# Patient Record
Sex: FEMALE | Race: WHITE | URBAN - METROPOLITAN AREA
[De-identification: names, ages, dates, MRNs, and addresses within clinical notes are randomized per-mention and may not be internally consistent; named-entity substitution may affect disease eponyms.]

---

## 2017-06-09 ENCOUNTER — OFFICE VISIT (OUTPATIENT)
Dept: PSYCHIATRY | Facility: CLINIC | Age: 72
End: 2017-06-09
Attending: CLINICAL NURSE SPECIALIST
Payer: COMMERCIAL

## 2017-06-09 VITALS — SYSTOLIC BLOOD PRESSURE: 124 MMHG | DIASTOLIC BLOOD PRESSURE: 82 MMHG | HEART RATE: 93 BPM | WEIGHT: 151 LBS

## 2017-06-09 DIAGNOSIS — F29 PSYCHOSIS, UNSPECIFIED PSYCHOSIS TYPE (H): ICD-10-CM

## 2017-06-09 DIAGNOSIS — F06.30 MOOD DISORDER DUE TO KNOWN PHYSIOLOGICAL CONDITION: Primary | ICD-10-CM

## 2017-06-09 PROCEDURE — 99212 OFFICE O/P EST SF 10 MIN: CPT | Mod: ZF

## 2017-06-09 RX ORDER — METHENAMINE HIPPURATE 1000 MG/1
1 TABLET ORAL
COMMUNITY
Start: 2014-10-03 | End: 2019-07-05

## 2017-06-09 RX ORDER — LORATADINE 10 MG/1
10 TABLET ORAL DAILY
Qty: 30 TABLET | Refills: 1 | COMMUNITY
Start: 2017-06-09

## 2017-06-09 RX ORDER — AMOXICILLIN 500 MG/1
4 CAPSULE ORAL
COMMUNITY
Start: 2017-05-18

## 2017-06-09 RX ORDER — PREDNISONE 5 MG/1
5 TABLET ORAL
COMMUNITY
Start: 2016-05-27

## 2017-06-09 RX ORDER — LISINOPRIL 10 MG/1
10 TABLET ORAL
COMMUNITY
Start: 2017-03-10

## 2017-06-09 RX ORDER — QUETIAPINE FUMARATE 200 MG/1
200 TABLET, FILM COATED ORAL AT BEDTIME
Qty: 90 TABLET | Refills: 1 | Status: SHIPPED | OUTPATIENT
Start: 2017-06-09 | End: 2018-01-19

## 2017-06-09 RX ORDER — AMLODIPINE BESYLATE 10 MG/1
10 TABLET ORAL
COMMUNITY
Start: 2016-09-09 | End: 2017-06-09

## 2017-06-09 NOTE — MR AVS SNAPSHOT
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      Your next 10 appointments already scheduled     Dec 29, 2017  1:15 PM CST   Adult Med Follow UP with KANCHAN Orosco CNS   Psychiatry Clinic ([facility])    [address] [facility] [address] [phone]              Who to contact     Please call your clinic at [phone] to:    Ask questions about your health    Make or cancel appointments    Discuss your medicines    Learn about your test results    Speak to your doctor   If you have compliments or concerns about an experience at your clinic, or if you wish to file a complaint, please contact [facility] Patient Relations at [phone] or email us at [email or web address]         Additional Information About Your Visit        MyChart Information     Banc[employer] is an electronic gateway that provides easy, online access to your medical records. With [employer], you can request a clinic appointment, read your test results, renew a prescription or communicate with your care team.     To sign up for [employer] visit the website at [email or web address]   You will be asked to enter the access code listed below, as well as some personal information. Please follow the directions to create your username and password.     Your access code is: [email or web address]  Expires: 2017  9:53 PM     Your access code will  in 90 days. If you  need help or a new code, please contact your [facility] or call [phone] for assistance.        Care EveryWhere ID     This is your Care EveryWhere ID. This could be used by other organizations to access your [place] medical records  [id number]        Your Vitals Were     Pulse                   93            Blood Pressure from Last 3 Encounters:   06/09/17 124/82   11/04/16 128/77   05/27/16 145/81    Weight from Last 3 Encounters:   06/09/17 68.5 kg (151 lb)   11/04/16 74.3 kg (163 lb 12.8 oz)   05/27/16 73.8 kg (162 lb 9.6 oz)              Today, you had the following     No orders found for display         Today's Medication Changes          These changes are accurate as of: 6/9/17 11:59 PM.  If you have any questions, ask your nurse or doctor.               These medicines have changed or have updated prescriptions.        Dose/Directions    lisinopril 10 MG tablet   Commonly known as:  PRINIVIL/ZESTRIL   This may have changed:  Another medication with the same name was removed. Continue taking this medication, and follow the directions you see here.   Changed by:  Alyssia Winn APRN CNS        Dose:  10 mg   Take 10 mg by mouth   Refills:  0       predniSONE 5 MG tablet   Commonly known as:  DELTASONE   This may have changed:  Another medication with the same name was removed. Continue taking this medication, and follow the directions you see here.   Changed by:  Alyssia Winn APRN CNS        Dose:  5 mg   Take 5 mg by mouth   Refills:  0            Where to get your medicines      These medications were sent to [facility] [address] [place] [address] [address] [facility]  [address] [facility] [address]     Phone:  [phone]     QUEtiapine 200 MG tablet                Primary Care Provider Office Phone # Fax #    Feliberto Benny [phone]       [address]  [place] [address]        Thank you!     Thank you for choosing PSYCHIATRY CLINIC  for your care. Our goal is always to provide you with excellent care. Hearing back from our patients is one way we can continue to improve our services. Please take a few minutes to complete the written survey that you may receive in the mail after your visit with us. Thank you!             Your Updated Medication List - Protect others around you: Learn how to safely use, store and throw away your medicines at www.disposemymeds.org.          This list is accurate as of: 6/9/17 11:59 PM.  Always use your most recent med list.                   Brand Name Dispense Instructions for use    amLODIPine 10 MG tablet    NORVASC     Take 10 mg by mouth daily       amoxicillin 500 MG capsule    AMOXIL     Take 4 capsules by mouth       aspirin  MG EC tablet      Take 325 mg by mouth daily       atorvastatin 40 MG tablet    LIPITOR     Take 40 mg by mouth daily       CLARITIN 10 MG tablet   Generic drug:  loratadine     30 tablet    Take 1 tablet (10 mg) by mouth daily       EQL OMEGA 3 FISH OIL 1200 MG Caps      Take 1 capsule by mouth daily       folic acid 1 MG tablet    FOLVITE    90 tablet    Take 1 tablet (1 mg) by mouth daily       glipiZIDE 2.5 MG 24 hr tablet    GLUCOTROL XL     Take 2.5 mg by mouth daily       lisinopril 10 MG tablet    PRINIVIL/ZESTRIL     Take 10 mg by mouth       methenamine hippurate 1 G Tabs tablet    HIPREX     Take 1 g by mouth       predniSONE 5 MG tablet    DELTASONE     Take 5 mg by mouth       * priLOSEC 40 MG capsule   Generic drug:  omeprazole      Take 40 mg by mouth daily       * omeprazole 20 MG CR capsule    priLOSEC     Take 20 mg by mouth       psyllium 28.3 % Powd      Take 1 tsp. by mouth daily       QUEtiapine 200 MG tablet    SEROquel    90 tablet    Take 1 tablet (200 mg) by mouth At Bedtime       vitamin D3 1000 UNITS Caps      Take 1 capsule by mouth       * Notice:  This list  has 2 medication(s) that are the same as other medications prescribed for you. Read the directions carefully, and ask your doctor or other care provider to review them with you.

## 2017-06-09 NOTE — PROGRESS NOTES
Outpatient Psychiatry Progress Note     Provider: KANCHAN Orosco CNS  Date: 2017  Service:  Medication follow up with counseling.   Patient Identification: Radha Fitch  : 1945   MRN: [id number]    Radha Fitch is a 71 year old year old female who presents for ongoing psychiatric care.  Radha Fitch was last seen in clinic on 17.   At that time,   Assessment & Plan       Radha Fitch is seen today for follow up and reports her mood has been stable with continued auditory hallucinations. 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   Another granddaughter is getting  . She is looking forward to this.  Had knee replacement in November but there is some problem and she needs to have ligament shortened.    Side effects of medication include: no change  Psychiatric Review of Systems:  The patient endorses symptoms of depression: In the last 2 weeks per PHQ 9 no symptoms  She  patient endorses symptoms of anxiety : denies  She endorses symptoms of kavon including none.    She endorses symptoms of psychosis including hallucinations stable.       Review of Medical Systems:  Sleep: stable  Energy: stable  Concentration: stable  Appetite: has been working on losing weight  GI Concerns:  none  Cardiac concerns: none  Neurological concerns: no new concerns  Other medical concerns: no new concerns  Current Substance Use:  Alcohol:none  Other drugs:none  Caffeine:not reviewed  Nicotine: none  Past Medical History: No past medical history on file.  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       Allergies:   Allergies   Allergen Reactions     Cinnamon      Other reaction(s): Other - Describe In Comment Field  Get sores in mouth- not sure if red dye in the cinnamon candy     Solifenacin      Other reaction(s): Other - Describe In Comment Field  Dry mouth     Sulfa Drugs      Other reaction(s): Edema  face          Current Medications     Current Outpatient Prescriptions Ordered in [facility]   Medication Sig Dispense Refill     QUEtiapine (SEROQUEL) 200 MG tablet Take 1 tablet (200 mg) by mouth At Bedtime 90 tablet 1     folic acid (FOLVITE) 1 MG tablet Take 1 tablet (1 mg) by mouth daily 90 tablet 0     amLODIPine (NORVASC) 10 MG tablet Take 10 mg by mouth daily       aspirin  MG tablet Take 325 mg by mouth daily       atorvastatin (LIPITOR) 40 MG tablet Take 40 mg by mouth daily       Cholecalciferol (D 1000) 1000 UNITS CAPS Take 1 capsule by mouth daily       Omega-3 Fatty Acids (EQL OMEGA 3 FISH OIL) 1200 MG CAPS Take 1 capsule by mouth daily       glipiZIDE (GLUCOTROL XL) 2.5 MG 24 hr tablet Take 2.5 mg by mouth daily       lisinopril (PRINIVIL,ZESTRIL) 20 MG tablet Take 20 mg by mouth daily       Methenamine Mandelate 0.5 G TABS Take 1 g by mouth daily       omeprazole (PRILOSEC) 40 MG capsule Take 40 mg by mouth daily       predniSONE (DELTASONE) 5 MG tablet Take 5 mg by mouth daily       psyllium 28.3 % POWD Take 1 tsp. by  "mouth daily       No current Epic-ordered facility-administered medications on file.         Mental Status Exam     Appearance:  Casually dressed and Well groomed  Behavior/relationship to examiner/demeanor:  Cooperative  Orientation: Oriented to person, place, time and situation  Psychomotor: normal form  Speech Rate:  Normal  Speech Spontaneity:  Normal  Mood:  \"okay\"  Affect:  Appropriate/mood-congruent  Thought Process (Associations):  Goal directed  Thought Content:  no overt psychosis, denies suicidal ideation, intent or thoughts and patient does not appear to be responding to internal stimuli  Abnormal Perception:  None  Attention/Concentration:  Normal  Language:  Intact  Insight:  Good  Judgment:  Good      Results     Vital signs: /82  Pulse 93  Wt 68.5 kg (151 lb)    Laboratory Data:  reviewed previous data    Assessment & Plan      Radha Fitch is seen today for follow up and reports symptoms are stable and she would like to continue current medication.    Diagnosis  Axis 1: Psychosis unspecified, Mood disorder due to known physiological conditions  Axis 2: none  Axis 3: See problem list in the medical record    Plan:  Medication: Continue current medication  OTC Recommendations: none  Lab Orders:  none  Referrals: none  Release of Information: none  Future Treatment Considerations:per symptoms  Return for Follow Up:6 months   The risks, benefits, alternatives and side effects have been discussed and are understood by the patient. The patient understands the risks of using street drugs or alcohol. There are no medical contraindications, the patient agrees to treatment, and has the capacity to do so. The patient understands to call 911 or come to the nearest ED if life threatening or urgent symptoms present.  Over 50% of this time was spent counseling the patient and/or coordinating care regarding review of social and occupational functioning.  In addition patient was counseled on health and " wellness practices to augment medication treatment of symptoms. See note for details.    Alyssia Winn, APRN CNS 6/9/2017

## 2017-06-09 NOTE — NURSING NOTE
Chief Complaint   Patient presents with     RECHECK     Psychosis, unspecified psychosis type      Reviewed allergies, smoking status, and pharmacy preference  Administered abuse screening questions-done 6/9/17   Obtained weight, blood pressure and heart rate   Jess Wilson LPN

## 2017-12-11 ENCOUNTER — OFFICE VISIT (OUTPATIENT)
Dept: URGENT CARE | Facility: URGENT CARE | Age: 72
End: 2017-12-11
Payer: COMMERCIAL

## 2017-12-11 ENCOUNTER — TRANSFERRED RECORDS (OUTPATIENT)
Dept: HEALTH INFORMATION MANAGEMENT | Facility: CLINIC | Age: 72
End: 2017-12-11

## 2017-12-11 VITALS
DIASTOLIC BLOOD PRESSURE: 84 MMHG | HEART RATE: 102 BPM | WEIGHT: 164 LBS | TEMPERATURE: 98.9 F | OXYGEN SATURATION: 92 % | SYSTOLIC BLOOD PRESSURE: 161 MMHG

## 2017-12-11 DIAGNOSIS — M79.89 SWOLLEN LEG: Primary | ICD-10-CM

## 2017-12-11 DIAGNOSIS — M71.22 SYNOVIAL CYST OF LEFT POPLITEAL SPACE: ICD-10-CM

## 2017-12-11 PROCEDURE — 99215 OFFICE O/P EST HI 40 MIN: CPT | Performed by: NURSE PRACTITIONER

## 2017-12-11 NOTE — MR AVS SNAPSHOT
"              After Visit Summary   12/11/2017    Radha Fitch    MRN: [id number]           Patient Information     Date Of Birth          1945        Visit Information        Provider Department      12/11/2017 7:10 PM Rashmi Nava APRN CNP [facility]        Today's Diagnoses     Swollen leg    -  1       Follow-ups after your visit        Your next 10 appointments already scheduled     Jan 19, 2018  1:45 PM CST   Adult Med Follow UP with KANCHAN Orosco CNS   Psychiatry Clinic ([facility])    [address]  [address] [facility] [id number] [phone]              Future tests that were ordered for you today     Open Future Orders        Priority Expected Expires Ordered    US Lower Extremity Venous Duplex Bilateral STAT  12/11/2018 12/11/2017            Who to contact     If you have questions or need follow up information about today's clinic visit or your schedule please contact [facility] directly at [phone].  Normal or non-critical lab and imaging results will be communicated to you by [employer], letter or phone within 4 business days after the clinic has received the results. If you do not hear from us within 7 days, please contact the clinic through [employer] or phone. If you have a critical or abnormal lab result, we will notify you by phone as soon as possible.  Submit refill requests through [employer] or call your pharmacy and they will forward the refill request to us. Please allow 3 business days for your refill to be completed.          Additional Information About Your Visit        Interactive FitnessharNetzoptiker Information     Ps[employer] lets you send messages to your doctor, view your test results, renew your prescriptions, schedule appointments and more. To sign up, go to [email or web address] . Click on \"Log in\" on the left side of the screen, which will take you to the Welcome page. Then click on \"Sign up Now\" on the " right side of the page.     You will be asked to enter the access code listed below, as well as some personal information. Please follow the directions to create your username and password.     Your access code is: [email or web address]  Expires: 3/11/2018  7:51 PM     Your access code will  in 90 days. If you need help or a new code, please call your [place] clinic or [phone].        Care EveryWhere ID     This is your Care EveryWhere ID. This could be used by other organizations to access your [place] medical records  [id number]        Your Vitals Were     Pulse Temperature Pulse Oximetry             102 98.9  F (37.2  C) (Oral) 92%          Blood Pressure from Last 3 Encounters:   17 161/84    Weight from Last 3 Encounters:   17 164 lb (74.4 kg)               Primary Care Provider Office Phone # Fax #    Feliberto Benny [phone]       [facility] [address] [place] [address]        Equal Access to Services     [facility]: Hadii aad ku hadasho Soomaali, waaxda luqadaha, qaybta kaalmada adeegyada, waxay idiin hayaan mary ann agrawal . So [facility] [phone].    ATENCIÓN: Si habla español, tiene a espinosa disposición servicios gratuitos de asistencia lingüística. Llame al [phone].    We comply with applicable federal civil rights laws and [place] laws. We do not discriminate on the basis of race, color, national origin, age, disability, sex, sexual orientation, or gender identity.            Thank you!     Thank you for choosing [facility]  for your care. Our goal is always to provide you with excellent care. Hearing back from our patients is one way we can continue to improve our services. Please take a few minutes to complete the written survey that you may receive in the mail after your visit with us. Thank you!             Your Updated Medication List - Protect others around you: Learn how to safely use, store and throw away your  medicines at www.disposemymeds.org.          This list is accurate as of: 12/11/17  7:51 PM.  Always use your most recent med list.                   Brand Name Dispense Instructions for use Diagnosis    amLODIPine 10 MG tablet    NORVASC     Take 10 mg by mouth daily        amoxicillin 500 MG capsule    AMOXIL     Take 4 capsules by mouth        aspirin  MG EC tablet      Take 325 mg by mouth daily        atorvastatin 40 MG tablet    LIPITOR     Take 40 mg by mouth daily        CLARITIN 10 MG tablet   Generic drug:  loratadine     30 tablet    Take 1 tablet (10 mg) by mouth daily        EQL OMEGA 3 FISH OIL 1200 MG Caps      Take 1 capsule by mouth daily        folic acid 1 MG tablet    FOLVITE    90 tablet    Take 1 tablet (1 mg) by mouth daily    Hyperhomocystinemia (H)       glipiZIDE 2.5 MG 24 hr tablet    GLUCOTROL XL     Take 2.5 mg by mouth daily        lisinopril 10 MG tablet    PRINIVIL/ZESTRIL     Take 10 mg by mouth        methenamine hippurate 1 G Tabs tablet    HIPREX     Take 1 g by mouth        predniSONE 5 MG tablet    DELTASONE     Take 5 mg by mouth        * priLOSEC 40 MG capsule   Generic drug:  omeprazole      Take 40 mg by mouth daily        * omeprazole 20 MG CR capsule    priLOSEC     Take 20 mg by mouth        psyllium 28.3 % Powd      Take 1 tsp. by mouth daily        QUEtiapine 200 MG tablet    SEROquel    90 tablet    Take 1 tablet (200 mg) by mouth At Bedtime    Psychosis, unspecified psychosis type       vitamin D3 1000 UNITS Caps      Take 1 capsule by mouth        * Notice:  This list has 2 medication(s) that are the same as other medications prescribed for you. Read the directions carefully, and ask your doctor or other care provider to review them with you.

## 2017-12-12 NOTE — PROGRESS NOTES
SUBJECTIVE:                                                    Radha Fitch is a 72 year old female who presents to clinic today for the following health issues:    Musculoskeletal problem/pain      Duration: 2 days    Description  Location: L lower leg    Intensity:  moderate    Accompanying signs and symptoms: pain, swelling, redness.     History  Previous similar problem: no   Previous evaluation:  none    Precipitating or alleviating factors:  Trauma or overuse: YES, bumped leg a few weeks ago  Aggravating factors include: sitting, standing and walking    Therapies tried and outcome: nothing      Problem list and histories reviewed & adjusted, as indicated.  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    Social History   Substance Use Topics     Smoking status: Never Smoker     Smokeless tobacco: Never Used     Alcohol use Not on file     No family history on file.        ROS:  Constitutional, HEENT, cardiovascular, pulmonary, GI, , musculoskeletal, neuro, skin, endocrine and psych systems are negative, except as otherwise noted.      OBJECTIVE:   /84  Pulse 102  Temp 98.9  F (37.2  C) (Oral)  Wt 164 lb (74.4 kg)  SpO2 92%  There is no height or weight on file to calculate BMI.  GENERAL: Alert and no distress  RESP: lungs clear to auscultation - no rales, rhonchi or wheezes  CV: regular rate and rhythm, normal S1 S2, no S3 or S4, no murmur, click or rub, no peripheral edema and peripheral pulses strong  MS: no gross musculoskeletal defects noted, no edema    Diagnostic Test Results:  none     ASSESSMENT/PLAN:     1. Swollen leg  Rule out dvt which could be  life threatening, sent to [facility]   Son in law will drive her now  Will do hold and call with results    Received results 905 pm, no dvt  Did find complex bakers cyst [id number], called patient with results  Ortho referral placed, call and schedule tomorrow when they open    Follow up with pcp as needed    KANCHAN Guaman CNP  [facility]

## 2018-01-19 ENCOUNTER — OFFICE VISIT (OUTPATIENT)
Dept: PSYCHIATRY | Facility: CLINIC | Age: 73
End: 2018-01-19
Attending: CLINICAL NURSE SPECIALIST
Payer: COMMERCIAL

## 2018-01-19 VITALS — HEART RATE: 101 BPM | DIASTOLIC BLOOD PRESSURE: 80 MMHG | SYSTOLIC BLOOD PRESSURE: 137 MMHG | WEIGHT: 163 LBS

## 2018-01-19 DIAGNOSIS — F29 PSYCHOSIS, UNSPECIFIED PSYCHOSIS TYPE (H): Primary | ICD-10-CM

## 2018-01-19 DIAGNOSIS — F06.30 MOOD DISORDER DUE TO KNOWN PHYSIOLOGICAL CONDITION: ICD-10-CM

## 2018-01-19 PROCEDURE — G0463 HOSPITAL OUTPT CLINIC VISIT: HCPCS | Mod: ZF

## 2018-01-19 RX ORDER — AMLODIPINE BESYLATE 5 MG/1
TABLET ORAL
COMMUNITY
Start: 2008-03-21

## 2018-01-19 RX ORDER — QUETIAPINE FUMARATE 200 MG/1
200 TABLET, FILM COATED ORAL AT BEDTIME
Qty: 90 TABLET | Refills: 1 | Status: SHIPPED | OUTPATIENT
Start: 2018-01-19 | End: 2018-07-31

## 2018-01-19 ASSESSMENT — PAIN SCALES - GENERAL: PAINLEVEL: NO PAIN (0)

## 2018-01-19 NOTE — MR AVS SNAPSHOT
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      Your next 10 appointments already scheduled     2018  1:15 PM CDT   Adult Med Follow UP with KANCHAN Orosco CNS   Psychiatry Clinic ([facility])    [address] [facility] [address] [phone]              Who to contact     Please call your clinic at [phone] to:    Ask questions about your health    Make or cancel appointments    Discuss your medicines    Learn about your test results    Speak to your doctor   If you have compliments or concerns about an experience at your clinic, or if you wish to file a complaint, please contact [facility] Patient Relations at [phone] or email us at [email or web address]         Additional Information About Your Visit        MyChart Information     myhomemovet is an electronic gateway that provides easy, online access to your medical records. With [employer], you can request a clinic appointment, read your test results, renew a prescription or communicate with your care team.     To sign up for myhomemovet visit the website at [email or web address]   You will be asked to enter the access code listed below, as well as some personal information. Please follow the directions to create your username and password.     Your access code is: [email or web address]  Expires: 3/11/2018  7:51 PM     Your access code will  in 90 days. If you  need help or a new code, please contact your [facility] or call [phone] for assistance.        Care EveryWhere ID     This is your Care EveryWhere ID. This could be used by other organizations to access your [facility] medical records  [id number]        Your Vitals Were     Pulse                   101            Blood Pressure from Last 3 Encounters:   01/19/18 137/80   12/11/17 161/84   06/09/17 124/82    Weight from Last 3 Encounters:   01/19/18 73.9 kg (163 lb)   12/11/17 74.4 kg (164 lb)   06/09/17 68.5 kg (151 lb)              Today, you had the following     No orders found for display         Today's Medication Changes          These changes are accurate as of 1/19/18 11:59 PM.  If you have any questions, ask your nurse or doctor.               These medicines have changed or have updated prescriptions.        Dose/Directions    NORVASC 5 MG tablet   This may have changed:  Another medication with the same name was removed. Continue taking this medication, and follow the directions you see here.   Generic drug:  amLODIPine   Changed by:  Alyssia Winn APRN CNS        Refills:  0            Where to get your medicines      These medications were sent to [facility] [address] [place] [address] [address] [place] [address] AT [facility] [place]  [address] [facility] [address]     Phone:  [phone]     QUEtiapine 200 MG tablet                Primary Care Provider Office Phone # Fax #    Feliberto Benny [phone]       [facility] [address] [place] [address]        Equal Access to Services     [facility] MISHA : Hadii aad ku hadasho Soomaali, waaxda luqadaha, qaybta kaalmada adecherryyadian, concha shetty. So [facility] [phone].    ATENCIÓN: Si habla español, tiene a espinosa disposición servicios gratuitos de asistencia lingüística. Llame al [phone].    We comply with applicable federal  civil rights laws and [place] laws. We do not discriminate on the basis of race, color, national origin, age, disability, sex, sexual orientation, or gender identity.            Thank you!     Thank you for choosing PSYCHIATRY CLINIC  for your care. Our goal is always to provide you with excellent care. Hearing back from our patients is one way we can continue to improve our services. Please take a few minutes to complete the written survey that you may receive in the mail after your visit with us. Thank you!             Your Updated Medication List - Protect others around you: Learn how to safely use, store and throw away your medicines at www.disposemymeds.org.          This list is accurate as of 1/19/18 11:59 PM.  Always use your most recent med list.                   Brand Name Dispense Instructions for use Diagnosis    amoxicillin 500 MG capsule    AMOXIL     Take 4 capsules by mouth        aspirin  MG EC tablet      Take 325 mg by mouth daily        atorvastatin 40 MG tablet    LIPITOR     Take 40 mg by mouth daily        CLARITIN 10 MG tablet   Generic drug:  loratadine     30 tablet    Take 1 tablet (10 mg) by mouth daily        EQL OMEGA 3 FISH OIL 1200 MG Caps      Take 1 capsule by mouth daily        folic acid 1 MG tablet    FOLVITE    90 tablet    Take 1 tablet (1 mg) by mouth daily    Hyperhomocystinemia (H)       glipiZIDE 2.5 MG 24 hr tablet    GLUCOTROL XL     Take 2.5 mg by mouth daily        lisinopril 10 MG tablet    PRINIVIL/ZESTRIL     Take 10 mg by mouth        methenamine hippurate 1 G Tabs tablet    HIPREX     Take 1 g by mouth        NORVASC 5 MG tablet   Generic drug:  amLODIPine           predniSONE 5 MG tablet    DELTASONE     Take 5 mg by mouth        * priLOSEC 40 MG capsule   Generic drug:  omeprazole      Take 40 mg by mouth daily        * omeprazole 20 MG CR capsule    priLOSEC     Take 20 mg by mouth        psyllium 28.3 % Powd      Take 1 tsp. by mouth daily         QUEtiapine 200 MG tablet    SEROquel    90 tablet    Take 1 tablet (200 mg) by mouth At Bedtime    Psychosis, unspecified psychosis type       vitamin D3 1000 UNITS Caps      Take 1 capsule by mouth        * Notice:  This list has 2 medication(s) that are the same as other medications prescribed for you. Read the directions carefully, and ask your doctor or other care provider to review them with you.

## 2018-01-19 NOTE — NURSING NOTE
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
## 2018-07-31 DIAGNOSIS — F29 PSYCHOSIS, UNSPECIFIED PSYCHOSIS TYPE (H): ICD-10-CM

## 2018-07-31 RX ORDER — QUETIAPINE FUMARATE 200 MG/1
200 TABLET, FILM COATED ORAL AT BEDTIME
Qty: 40 TABLET | Refills: 0 | Status: SHIPPED | OUTPATIENT
Start: 2018-07-31 | End: 2018-09-13

## 2018-07-31 NOTE — TELEPHONE ENCOUNTER
Medication requested: seroquel 200mg tabs  Last refilled: 5/7/18  Qty: 90      Last seen: 1/19/18  RTC: 6 months  Cancel: x1  No-show: 0  Next appt: 9/14/18    Refill decision:   Refill pended and routed to the provider for review/determination due to Cancelled x1

## 2018-08-20 ENCOUNTER — TELEPHONE (OUTPATIENT)
Dept: PSYCHIATRY | Facility: CLINIC | Age: 73
End: 2018-08-20

## 2018-08-20 NOTE — TELEPHONE ENCOUNTER
Med refill/question  Received: Today       Amanda Bustamante Radhika RN       Phone Number: [phone]                     Hi,     This pt called to reschedule an appointment with Alyssia from September 14 to October 26. She said she will need a refill of her medication before her next appointment (not necessarily right now). Could you see if Alyssia can allow another refill before her October appointment?     Thanks!       - Call to patient at [phone]   - No answer   - LVM, requesting a call a week before running out of the medication to start a refill process   - Clinic number provided for further questions

## 2018-09-13 ENCOUNTER — TELEPHONE (OUTPATIENT)
Dept: PSYCHIATRY | Facility: CLINIC | Age: 73
End: 2018-09-13

## 2018-09-13 DIAGNOSIS — F29 PSYCHOSIS, UNSPECIFIED PSYCHOSIS TYPE (H): ICD-10-CM

## 2018-09-13 RX ORDER — QUETIAPINE FUMARATE 200 MG/1
200 TABLET, FILM COATED ORAL AT BEDTIME
Qty: 90 TABLET | Refills: 0 | Status: SHIPPED | OUTPATIENT
Start: 2018-09-13 | End: 2018-10-26

## 2018-09-13 NOTE — TELEPHONE ENCOUNTER
Last seen: 1/19/18  RTC: 6 months  Cancel: 9/14/18  No-show: None  Next appt: 10/26/18     Medication requested: QUEtiapine (SEROQUEL) 200 MG tablet  Directions: Take 1 tablet (200 mg) by mouth At Bedtime - Oral  Qty: 40 tabs to get pt to appt on 9/14/18  Last refilled: 7/31/18     Routing to provider for authorization due to cancellation x1 and outside of RTC.

## 2018-09-13 NOTE — TELEPHONE ENCOUNTER
-Med refilled by provider.      Alyssia Winn, APRN CNS   You 34 minutes ago (2:10 PM)                 I just went ahead and refilled a 3 months supply. She is reliable and needs to stay on the medication. I think she was cancelled due to my dental appointment.   Alyssia (Routing comment)

## 2018-09-13 NOTE — TELEPHONE ENCOUNTER
Tania Marie Emily, RN        Phone Number: [phone]                     Caller: Radha   Medication:  Quetiapine 200 mg   Pharmacy and location:  [facility]   Have you contacted the pharmacy: Yes   How much of medication do you have left:  4 pills   Pending appt: Yes   Okay to leave VM:  Yes     Thanks!

## 2018-10-26 ENCOUNTER — OFFICE VISIT (OUTPATIENT)
Dept: PSYCHIATRY | Facility: CLINIC | Age: 73
End: 2018-10-26
Attending: CLINICAL NURSE SPECIALIST
Payer: COMMERCIAL

## 2018-10-26 VITALS — SYSTOLIC BLOOD PRESSURE: 144 MMHG | HEART RATE: 95 BPM | DIASTOLIC BLOOD PRESSURE: 75 MMHG | WEIGHT: 166.4 LBS

## 2018-10-26 DIAGNOSIS — F29 PSYCHOSIS, UNSPECIFIED PSYCHOSIS TYPE (H): Primary | ICD-10-CM

## 2018-10-26 DIAGNOSIS — F06.30 MOOD DISORDER DUE TO KNOWN PHYSIOLOGICAL CONDITION: ICD-10-CM

## 2018-10-26 PROCEDURE — G0463 HOSPITAL OUTPT CLINIC VISIT: HCPCS | Mod: ZF

## 2018-10-26 RX ORDER — QUETIAPINE FUMARATE 200 MG/1
200 TABLET, FILM COATED ORAL AT BEDTIME
Qty: 90 TABLET | Refills: 1 | Status: SHIPPED | OUTPATIENT
Start: 2018-10-26 | End: 2019-06-04

## 2018-10-26 RX ORDER — GLIPIZIDE 5 MG/1
5 TABLET, FILM COATED, EXTENDED RELEASE ORAL
COMMUNITY
Start: 2018-08-17

## 2018-10-26 ASSESSMENT — PAIN SCALES - GENERAL: PAINLEVEL: NO PAIN (0)

## 2018-10-26 NOTE — NURSING NOTE
Chief Complaint   Patient presents with     Recheck Medication     Psychosis, unspecified psychosis type        Patient will review medication list with provider during clinic visit today.Kena Godfrey/THEA

## 2018-10-26 NOTE — MR AVS SNAPSHOT
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    Lab Testing:  If you had lab testing today and your results are reassuring or normal they will be mailed to you or sent through [employer] within 7 days.   If the lab tests need quick action we will call you with the results.  The phone number we will call with results is # [phone] (home) . If this is not the best number please call our clinic and change the number.    Medication Refills:  If you need any refills please call your pharmacy and they will contact us. Our fax number for refills is [phone]. Please allow three business for refill processing.   If you need to  your refill at a new pharmacy, please contact the new pharmacy directly. The new pharmacy will help you get your medications transferred.     Scheduling:  If you have any concerns about today's visit or wish to schedule another appointment please call our office during normal business hours [phone] (8-5:00 M-F)    Contact Us:  Please call [phone] during business hours (8-5:00 M-F).  If after clinic hours, or on the weekend, please call  [phone].    Financial Assistance [phone]  [facility] Billing [phone]  [facility] Billing [phone]  Medical Records [phone]      MENTAL HEALTH CRISIS NUMBERS:  [facility]:   [facility] - [phone]   Crisis Residence [facility] - [phone]   Walk-In Counseling Center [facility] - [phone]   COPE 24/7 [facility] Team for Adults - [[phone]];  Child - [[phone]]     Crisis Connection - [phone]     [facility]:   [facility] - [phone]   Walk-in counseling [facility] - [phone]   Walk-in counseling [facility] - [phone]   Crisis Residence [facility] - [phone]   Urgent Care Adult Mental Health:   --Drop-in, 24/7 crisis line, and [place] Mobile Team [[phone]]    CRISIS TEXT LINE: Text [phone] from anywhere, anytime, any crisis 24/7;    OR SEE www.crisistextline.org     Poison Control Center - [phone]    CHILD: [facility] needs assessment team - [phone]     [facility] Lifeline - [phone]; or [facility] [employer] Lifeline - [phone]    If you have a medical emergency please call 911or go to the nearest ER.                    _____________________________________________    Again thank you for choosing PSYCHIATRY CLINIC and please let us know how we can best partner with you to improve you and your family's health.  You may be receiving a survey in the mail regarding this appointment. We would love to have your feedback, both positive and negative, so please fill out the survey and return it using the provided envelope. The survey is done by an external company, so your answers are anonymous.             Follow-ups after your visit        Follow-up notes from your care team     Return in about 6 months (around 4/26/2019).      Your next 10 appointments already scheduled     Jun 07, 2019  1:15 PM CDT   Adult Med Follow UP with KANCHAN Orosco CNS   Psychiatry Clinic ([facility])    [address] [id number] [phone]              Who to contact     Please call your clinic at [phone] to:    Ask questions about your health    Make or cancel appointments    Discuss your medicines    Learn about your test results    Speak to your doctor            Additional  Information About Your Visit        [employer] Information     [employer] is an electronic gateway that provides easy, online access to your medical records. With [employer], you can request a clinic appointment, read your test results, renew a prescription or communicate with your care team.     To sign up for [employer] visit the website at [email or web address]   You will be asked to enter the access code listed below, as well as some personal information. Please follow the directions to create your username and password.     Your access code is: [email or web address]  Expires: 2019 11:06 AM     Your access code will  in 90 days. If you need help or a new code, please contact your [facility] or call [phone] for assistance.        Care EveryWhere ID     This is your Care EveryWhere ID. This could be used by other organizations to access your [facility] medical records  [id number]        Your Vitals Were     Pulse                   95            Blood Pressure from Last 3 Encounters:   10/26/18 144/75   18 137/80   17 161/84    Weight from Last 3 Encounters:   10/26/18 75.5 kg (166 lb 6.4 oz)   18 73.9 kg (163 lb)   17 74.4 kg (164 lb)              Today, you had the following     No orders found for display         Where to get your medicines      These medications were sent to [facility] [address] [place] [address] [address] [place] [address] AT [facility] [address]   [facility] [address]     Phone:  [phone]     QUEtiapine 200 MG tablet          Primary Care Provider Office Phone # Fax #    Feliberto Benny [phone]       [facility] [address] [place] [address]        Equal Access to Services     HARVEY CABRAL : Isaias hongo Sobruno, waaxda luqadaha, qaybta kaalmada adeegyada, concha shetty. [facility] [phone].    ATENCIÓN: Si sussy espandrea,  tiene a espinosa disposición servicios gratuitos de asistencia lingüística. June morgan [phone].    We comply with applicable federal civil rights laws and [place] laws. We do not discriminate on the basis of race, color, national origin, age, disability, sex, sexual orientation, or gender identity.            Thank you!     Thank you for choosing PSYCHIATRY CLINIC  for your care. Our goal is always to provide you with excellent care. Hearing back from our patients is one way we can continue to improve our services. Please take a few minutes to complete the written survey that you may receive in the mail after your visit with us. Thank you!             Your Updated Medication List - Protect others around you: Learn how to safely use, store and throw away your medicines at www.disposemymeds.org.          This list is accurate as of 10/26/18 11:06 AM.  Always use your most recent med list.                   Brand Name Dispense Instructions for use Diagnosis    amoxicillin 500 MG capsule    AMOXIL     Take 4 capsules by mouth        aspirin 325 MG EC tablet      Take 325 mg by mouth daily        atorvastatin 40 MG tablet    LIPITOR     Take 40 mg by mouth daily        BREO ELLIPTA 200-25 MCG/INH inhaler   Generic drug:  fluticasone-vilanterol      INL 1 PUFF PO QD        CLARITIN 10 MG tablet   Generic drug:  loratadine     30 tablet    Take 1 tablet (10 mg) by mouth daily        denosumab 60 MG/ML Soln injection    PROLIA     Inject 60 mg Subcutaneous        EQL OMEGA 3 FISH OIL 1200 MG Caps      Take 1 capsule by mouth daily        folic acid 1 MG tablet    FOLVITE    90 tablet    Take 1 tablet (1 mg) by mouth daily    Hyperhomocystinemia (H)       glipiZIDE 5 MG 24 hr tablet    GLUCOTROL XL     Take 5 mg by mouth        lisinopril 10 MG tablet    PRINIVIL/ZESTRIL     Take 10 mg by mouth        methenamine hippurate 1 g Tabs tablet    HIPREX     Take 1 g by mouth        NORVASC 5 MG tablet   Generic drug:  amLODIPine            omeprazole 20 MG CR capsule    priLOSEC     Take 20 mg by mouth        predniSONE 5 MG tablet    DELTASONE     Take 5 mg by mouth        psyllium 28.3 % Powd      Take 1 tsp. by mouth daily        QUEtiapine 200 MG tablet    SEROquel    90 tablet    Take 1 tablet (200 mg) by mouth At Bedtime    Psychosis, unspecified psychosis type (H)       vitamin D3 1000 units Caps      Take 1 capsule by mouth

## 2018-10-26 NOTE — PROGRESS NOTES
Outpatient Psychiatry Progress Note     Provider: KANCHAN Orosco CNS  Date: 10/26/2018  Service:  Medication follow up with counseling.   Patient Identification: Radha Fitch  : 1945   MRN: [id number]    Radha Fitch is a 72 year old year old female who presents for ongoing psychiatric care.  Radha Fitch was last seen in clinic on 18.   At that time,   Assessment & Plan       Radha Fitch is seen today for follow up and reports her mood has been stable with continued auditory hallucinations which she can ignore but are bothersome. She has concern about the metabolic side effects but is also concerned about worsening symptoms with a medication change.     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 Consider change if further problems with oversedation at night or dizziness  OTC Recommendations: none  Lab Orders:  none  Referrals: none  Release of Information: none  Future Treatment Considerations:per symptoms  Return for Follow Up:6 months      ____________________________________________________________________________________________________________________________________________    10/26/2018  Today Radha reports her granddaughter is 17 and grandson is 15 and 10.   She is feeling good.  A humidifier pipe broke in her home and she was very groggy when she woke up which made it difficult to call for assistance.  In the last few weeks she has not been sleeping as well every night. When she doesn't sleep the voices are more troublesome. During the day she is usually can ignore them.  When she is idle she listens to them more. Voices tell her she is stupid or dumb, things she did that she actually did not due.   Side effects of medication include: maybe increased appetite  Psychiatric Review of Systems:  Depression: In the last 2 weeks per PHQ-9 score:   PHQ-9 SCORE 10/26/2018    Total Score -   Total Score 0       Anxiety : denies  Arielle na   Psychosis  See subjective.   ADHD na    Review of Medical Systems:  Sleep: stable  Energy: stable  Concentration: stable  Appetite: has been gaining weight  GI Concerns: no new concerns  Cardiac concerns: no new concerns  Neurological concerns: no new concerns  Other medical concerns: no new concerns  Current Substance Use:  Alcohol:denies frequent use or abuse  Other drugs:none  Caffeine:not reviewed  Nicotine: none  Past Medical History: No past medical history on file.  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   Medication Sig Dispense Refill     amLODIPine (NORVASC) 5 MG tablet        amoxicillin (AMOXIL) 500 MG capsule Take 4 capsules by mouth       aspirin  MG tablet Take 325 mg by mouth daily       atorvastatin (LIPITOR) 40 MG tablet Take 40 mg by mouth daily       Cholecalciferol (VITAMIN D3) 1000 UNITS CAPS Take 1 capsule by mouth       folic acid (FOLVITE) 1 MG tablet Take 1 tablet (1 mg) by mouth daily 90 tablet 0     glipiZIDE (GLUCOTROL XL) 2.5 MG 24 hr tablet Take 2.5 mg by mouth daily       lisinopril (PRINIVIL/ZESTRIL) 10 MG tablet Take 10 mg by mouth       loratadine (CLARITIN) 10 MG tablet Take 1 tablet (10 mg) by mouth daily 30 tablet 1     methenamine  "hippurate (HIPREX) 1 G TABS tablet Take 1 g by mouth       Omega-3 Fatty Acids (EQL OMEGA 3 FISH OIL) 1200 MG CAPS Take 1 capsule by mouth daily       omeprazole (PRILOSEC) 20 MG CR capsule Take 20 mg by mouth       omeprazole (PRILOSEC) 40 MG capsule Take 40 mg by mouth daily       predniSONE (DELTASONE) 5 MG tablet Take 5 mg by mouth       psyllium 28.3 % POWD Take 1 tsp. by mouth daily       QUEtiapine (SEROQUEL) 200 MG tablet Take 1 tablet (200 mg) by mouth At Bedtime 90 tablet 0     No current Epic-ordered facility-administered medications on file.         Mental Status Exam     Appearance:  Casually dressed and Well groomed  Behavior/relationship to examiner/demeanor:  Cooperative  Orientation: Oriented to person, place, time and situation  Psychomotor: normal form  Speech Rate:  Normal  Speech Spontaneity:  Normal  Mood:  \"okay\"  Affect:  Appropriate/mood-congruent  Thought Process (Associations):  Goal directed  Thought Content:  no overt psychosis, denies suicidal ideation, intent or thoughts and patient does not appear to be responding to internal stimuli  Abnormal Perception:  None  Attention/Concentration:  Normal  Insight:  Good  Judgment:  Good      Results     Vital signs: /75  Pulse 95  Wt 75.5 kg (166 lb 6.4 oz)    Laboratory Data:  reviewed from [facility]    Assessment & Plan      Radha Fitch is seen today for follow up and reports her mood has been stable with continued auditory hallucinations. Discussed difficulty she has with overeating. Will continue current medication but call if sleep difficulty continues.  Also will ask her PCP about ordering a sleep study.    Diagnosis   Encounter Diagnoses   Name Primary?     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   Release of Information: none  Future Treatment Considerations:per symptoms  Return for Follow Up: 6 months   The risks, benefits, alternatives and side effects have been discussed and are understood by the patient. The patient understands the risks of using street drugs or alcohol. There are no medical contraindications, the patient agrees to treatment, and has the capacity to do so. The patient understands to call 911 or come to the nearest ED if life threatening or urgent symptoms present.  In addition time was spent counseling the patient and/or coordinating care regarding review of social and occupational functioning.  In addition patient was counseled on health and wellness practices to augment medication treatment of symptoms. See note for details.    Alyssia Winn, APRN CNS 10/26/2018

## 2018-10-31 ENCOUNTER — TELEPHONE (OUTPATIENT)
Dept: PSYCHIATRY | Facility: CLINIC | Age: 73
End: 2018-10-31

## 2018-10-31 NOTE — TELEPHONE ENCOUNTER
Social Work  Outgoing Voicemail Message  [facility]      SW and SW learner Noreen left a detailed voicemail message for Radha Fitch. Social Workers introduced themselves and reason for call: assistance with resources for fitness and health.     Writer requested call back. Included writer's direct contact information and availability.     Plan: SW and learner will follow up within one week if no return call received.        Love Ventura, KEL, [facility]  Noreen Tan Social Work Learner          ----- Message -----     From: Alyssia Winn APRN CNS     Sent: 10/26/2018  10:57 AM       To: Love Ventura, [facility]  Subject: weight/loss exercise community programs for Radha Witt has problems with diabetes and weight doesn't drive and lives mostly on her own.  She is on social security. She might benefif to get out of her home more and interact with other people who are trying to eat healthier and exercise. Wondering if you know of any programs near her?   Thank you'Alyssia

## 2018-11-05 NOTE — TELEPHONE ENCOUNTER
Social Work  Outgoing Voicemail Message  [facility]      Left a detailed voicemail message for Radha Fitch introducing self, reason for call; Second call for resources for transportation for exorcise.    Writer requested call back. Included writer's direct contact information and availability.     Plan: This was the second phone call to patient, left voicemail. Social workers will answer any questions if patient calls back.          Love Ventura, KEL, [facility]  Noreen Tan, Social Work Intern

## 2018-11-23 ASSESSMENT — PATIENT HEALTH QUESTIONNAIRE - PHQ9: SUM OF ALL RESPONSES TO PHQ QUESTIONS 1-9: 0

## 2019-04-03 ENCOUNTER — MEDICAL CORRESPONDENCE (OUTPATIENT)
Dept: HEALTH INFORMATION MANAGEMENT | Facility: CLINIC | Age: 74
End: 2019-04-03

## 2019-06-03 DIAGNOSIS — F29 PSYCHOSIS, UNSPECIFIED PSYCHOSIS TYPE (H): ICD-10-CM

## 2019-06-03 NOTE — TELEPHONE ENCOUNTER
[facility] Call Center    Phone Message    May a detailed message be left on voicemail: yes    Reason for Call: Medication Refill Request    Has the patient contacted the pharmacy for the refill? Yes (the pharmacy faxed us requests on 5/28 and 6/3)  Name of medication being requested: quetiapine  Provider who prescribed the medication: Mika Jean CNP  [facility] [place]  Date medication is needed: asap (the patient has been out for several days, and the caller asked that this refill request be marked high priority)         Action Taken: Message routed to:  Other: [facility]

## 2019-06-03 NOTE — TELEPHONE ENCOUNTER
Last seen: 10/26/18  RTC: 6 months   Cancel: 6/7/19  No-show: none   Next appt: 7/5/19    Incoming refill from patient via phone     Medication requested: QUEtiapine (SEROQUEL) 200 MG tablet  Directions: Take 1 tablet (200 mg) by mouth At Bedtime - Oral  Qty: 90  Last refilled: 10/26/18    Writer placed a call to patient at [phone]. No answer at number provided. LVM, requesting a call back. Clinic number provided.     Routed to provider

## 2019-06-03 NOTE — TELEPHONE ENCOUNTER
Writer received incoming call from patient [phone]. Patient reports taking Seroquel 200 mg daily as prescribed without missing any doses. Writer placed a call to pharmacy and confirmed last refilled on 2/27/19, 12/7/18 and 9/14/18.    Routed to provider for approval

## 2019-06-04 RX ORDER — QUETIAPINE FUMARATE 200 MG/1
200 TABLET, FILM COATED ORAL AT BEDTIME
Qty: 90 TABLET | Refills: 0 | Status: SHIPPED | OUTPATIENT
Start: 2019-06-04 | End: 2019-08-29

## 2019-06-04 RX ORDER — QUETIAPINE FUMARATE 200 MG/1
200 TABLET, FILM COATED ORAL AT BEDTIME
Qty: 90 TABLET | Refills: 1 | OUTPATIENT
Start: 2019-06-04

## 2019-06-04 NOTE — TELEPHONE ENCOUNTER
Meds refilled by provider   Med tab changed to reflect this   Patient notified of the refill     No further action needed by this writer

## 2019-07-05 ENCOUNTER — OFFICE VISIT (OUTPATIENT)
Dept: PSYCHIATRY | Facility: CLINIC | Age: 74
End: 2019-07-05
Attending: NURSE PRACTITIONER
Payer: COMMERCIAL

## 2019-07-05 VITALS — HEART RATE: 70 BPM | SYSTOLIC BLOOD PRESSURE: 124 MMHG | WEIGHT: 165 LBS | DIASTOLIC BLOOD PRESSURE: 77 MMHG

## 2019-07-05 DIAGNOSIS — R79.83 HYPERHOMOCYSTINEMIA: ICD-10-CM

## 2019-07-05 DIAGNOSIS — F29 PSYCHOSIS, UNSPECIFIED PSYCHOSIS TYPE (H): ICD-10-CM

## 2019-07-05 PROCEDURE — G0463 HOSPITAL OUTPT CLINIC VISIT: HCPCS | Mod: ZF

## 2019-07-05 RX ORDER — FOLIC ACID 1 MG/1
1 TABLET ORAL DAILY
Qty: 90 TABLET | Refills: 0 | Status: CANCELLED | OUTPATIENT
Start: 2019-07-05

## 2019-07-05 RX ORDER — LORATADINE 10 MG/1
10 TABLET ORAL DAILY
Qty: 30 TABLET | Refills: 1 | Status: CANCELLED | OUTPATIENT
Start: 2019-07-05

## 2019-07-05 RX ORDER — QUETIAPINE FUMARATE 200 MG/1
200 TABLET, FILM COATED ORAL AT BEDTIME
Qty: 90 TABLET | Refills: 0 | Status: CANCELLED | OUTPATIENT
Start: 2019-07-05

## 2019-07-05 ASSESSMENT — PAIN SCALES - GENERAL: PAINLEVEL: NO PAIN (0)

## 2019-07-05 NOTE — PROGRESS NOTES
"    Psychiatry Clinic [facility]  PSYCHIATRY PROGRESS NOTE       Radha Fitch is a 73 year old female who returns to the clinic for follow-up care.  Last seen by Alyssia Winn CNP at [facility] psychiatric clinic on 10/26/18.  Care transferred due to Alyssia not being available on Fridays.   Therapist: None  PCP: Feliberto Zapata  Other Providers: ROXIE Zapata: PCP.  Shaka Awan: Nephrology.  VICKY Del Cid: Endocrinology    Pertinent Background:  See previous notes.  Psych critical item history includes psychosis [sxs include non-command auditory hallucinations] and mutiple psychotropic trials.     Interim History     [4, 4]     The patient is a good historian, reports good treatment adherence and was last seen 10/26/18.  Since the last visit, Radha transferred care from another NP in clinic due to her not being available on Fridays.  She was hospitalized on 3/20/19 at [facility] for 12 days due to hypercalcemia, hyperparathyroidism, hypomagnesemia, hypophosphatemia, and hypertension.  It appears she has attended the various medical follow-up appointments.  In regards to her mental health, Radha reports she does continue to hear voices daily, primarily in morning and bedtime.  Voices typically make negative and derogatory comments about her.  No command hallucinations.  No visual hallucinations.  No delusional thought content.  Currently taking Seroquel 200mg and has been several years.  Reports numerous previous antipsychotic trials.  Radha reports she \"would like the voices to go away and not to be on any pills and still be ok.\"  She does not want to adjust medications today as the Seroquel does help manage auditory hallucinations and helps with sleep promotion.  She does use various distraction skills to help manage breakthrough symptoms including online games, music, and reading.  No concerns with mood currently.  Fatigue is problematic and probably attributable to numerous medical issus and sedative " qualities of Seroquel.      Aside from sedation, no other concerns with Seroquel.  Adherent to all medications.      Recent significant lab values (6/22/19)  A1C: 10.1  Creatinine: 1.47  GFR: 35    Recent Symptoms:   Depression:  low energy  Elevated:  none  Psychosis:  auditory hallucinations without commands [details in Interim History]  Anxiety:  periodic worry  Panic Attack:  none  Trauma Related:  none     Recent Substance Use:  Alcohol- no, denies frequent alcohol use , Cannabis- no  and Other Illicit Drugs-none        Social/ Family History      [1ea,1ea]            [per patient report]               FINANCIAL SUPPORT- inheritance and payment for babysitting her grandchildren       CHILDREN- 2 adult children.  6 grandchildren   LIVING SITUATION- Lives alone in [facility] in [place]      LEGAL- None  EARLY HISTORY/ EDUCATION- Grew up in [place], [place].  2 years of [employer] school and 2 years at [employer]  SOCIAL/ SPIRITUAL SUPPORT- daughter, high-school friends.  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     Medical Review of Systems         [2,10]   The remainder of the review of systems is noncontributory  Allergy    Cinnamon; Solifenacin; and Sulfa drugs  Current Medications        Current Outpatient Medications   Medication Sig Dispense Refill     amLODIPine (NORVASC) 5 MG tablet        amoxicillin (AMOXIL) 500 MG capsule Take 4 capsules by mouth       aspirin  MG tablet Take 81 mg by mouth daily        atorvastatin  (LIPITOR) 40 MG tablet Take 40 mg by mouth daily       BREO ELLIPTA 200-25 MCG/INH Inhaler INL 1 PUFF PO QD  11     Cholecalciferol (VITAMIN D3) 1000 UNITS CAPS Take 1 capsule by mouth       denosumab (PROLIA) 60 MG/ML SOLN injection Inject 60 mg Subcutaneous       glipiZIDE (GLUCOTROL XL) 5 MG 24 hr tablet Take 5 mg by mouth       lisinopril (PRINIVIL/ZESTRIL) 10 MG tablet Take 10 mg by mouth       loratadine (CLARITIN) 10 MG tablet Take 1 tablet (10 mg) by mouth daily 30 tablet 1     methenamine hippurate (HIPREX) 1 G TABS tablet Take 1 g by mouth       Omega-3 Fatty Acids (EQL OMEGA 3 FISH OIL) 1200 MG CAPS Take 1 capsule by mouth daily       omeprazole (PRILOSEC) 20 MG CR capsule Take 20 mg by mouth       predniSONE (DELTASONE) 5 MG tablet Take 5 mg by mouth       psyllium 28.3 % POWD Take 1 tsp. by mouth daily       QUEtiapine (SEROQUEL) 200 MG tablet Take 1 tablet (200 mg) by mouth At Bedtime 90 tablet 0     folic acid (FOLVITE) 1 MG tablet Take 1 tablet (1 mg) by mouth daily (Patient not taking: Reported on 7/5/2019) 90 tablet 0     Vitals         [3, 3]   /77   Pulse 70   Wt 74.8 kg (165 lb)    Mental Status Exam        [9, 14 cog gs]     Alertness: alert  and oriented  Appearance: casually groomed  Behavior/Demeanor: cooperative, pleasant and calm, with good  eye contact   Speech: regular rate and rhythm  Language: intact  Psychomotor: normal or unremarkable  Mood: description consistent with euthymia  Affect: full range and appropriate; was congruent to mood; was congruent to content  Thought Process/Associations: unremarkable  Thought Content:  Reports none;  Denies suicidal ideation and violent ideation  Perception:  Reports auditory hallucinations without commands [details in Interim History];  Denies visual hallucinations  Insight: adequate  Judgment: intact  Cognition: (6) does  appear grossly intact; formal cognitive testing was not done  Gait/Station and/or Muscle Strength/Tone:  unremarkable    Labs and Data                          Rating Scales:    PHQ9    PHQ9 Today:  1  PHQ-9 SCORE 1/9/2015 5/8/2015 10/26/2018   PHQ-9 Total Score 1 0 -   PHQ-9 Total Score - - 0         Diagnosis      Psychosis, unspecified type (H)  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  She does not appear distressed.   A1C elevated, Creatinine elevated, and GFR abnormal.  Followed by internal medicine. She does not want to adjust medications.      [place] Prescription Monitoring [place] [] was not checked today:  not using controlled substances.        Plan                                                                                                                     m2, h3     1) MEDICATION:  Continue Seroquel 200mg    RTC: 6 months per Radha's request.  Advised to call clinic if experiences any concerns    CRISIS NUMBERS:   Provided routinely in AVS.    Treatment Risk Statement:  The patient understands the risks, benefits, adverse effects and alternatives. Agrees to treatment with the capacity to do so. No medical contraindications to treatment. Agrees to call clinic for any problems. The patient understands to call 911 or go to the nearest ED if life threatening or urgent symptoms occur.     WHODAS 2.0  TODAY total score = N/A; [a 12-item WHODAS 2.0 assessment was not completed by the pt today and/or recorded in EPIC].       PROVIDER:  KANCHAN Zacarias CNP

## 2019-07-10 RX ORDER — PANTOPRAZOLE SODIUM 40 MG/1
40 FOR SUSPENSION ORAL DAILY
COMMUNITY

## 2019-07-10 RX ORDER — METOPROLOL SUCCINATE 100 MG/1
100 TABLET, EXTENDED RELEASE ORAL DAILY
COMMUNITY

## 2019-07-10 RX ORDER — METHIMAZOLE 5 MG/1
5 TABLET ORAL
COMMUNITY

## 2019-07-12 ENCOUNTER — TRANSFERRED RECORDS (OUTPATIENT)
Dept: HEALTH INFORMATION MANAGEMENT | Facility: CLINIC | Age: 74
End: 2019-07-12

## 2019-08-29 DIAGNOSIS — F29 PSYCHOSIS, UNSPECIFIED PSYCHOSIS TYPE (H): ICD-10-CM

## 2019-09-03 RX ORDER — QUETIAPINE FUMARATE 200 MG/1
TABLET, FILM COATED ORAL
Qty: 90 TABLET | Refills: 0 | Status: SHIPPED | OUTPATIENT
Start: 2019-09-03 | End: 2019-11-27

## 2019-09-03 NOTE — TELEPHONE ENCOUNTER
Medication requested:  QUEtiapine (SEROQUEL) 200 MG   Last refilled: 6/4/19  Qty: 90  * Continue Seroquel 200mg    Last seen: 7/5/19  RTC: 6 MOS  Cancel: 0  No-show: 0  Next appt: 1/10/20  Refill decision:  Refilled for 30 days per protocol ( REPEAT RF )

## 2019-11-27 DIAGNOSIS — F29 PSYCHOSIS, UNSPECIFIED PSYCHOSIS TYPE (H): ICD-10-CM

## 2019-11-29 RX ORDER — QUETIAPINE FUMARATE 200 MG/1
TABLET, FILM COATED ORAL
Qty: 30 TABLET | Refills: 1 | Status: SHIPPED | OUTPATIENT
Start: 2019-11-29 | End: 2020-01-10

## 2019-11-29 NOTE — TELEPHONE ENCOUNTER
Medication requested: QUEtiapine (SEROQUEL) 200 MG tablet  Last refilled: 9-4-19  Qty: 90 (pharm call-confirmed)      Last seen: 7-5-19  RTC: 6 months  Cancel: 0  No-show: 0  Next appt: 1-10-20    Refill decision:   Refilled for 30 days per protocol. 1 RF

## 2020-01-10 ENCOUNTER — OFFICE VISIT (OUTPATIENT)
Dept: PSYCHIATRY | Facility: CLINIC | Age: 75
End: 2020-01-10
Attending: NURSE PRACTITIONER
Payer: COMMERCIAL

## 2020-01-10 VITALS — HEART RATE: 73 BPM | WEIGHT: 164.2 LBS | DIASTOLIC BLOOD PRESSURE: 66 MMHG | SYSTOLIC BLOOD PRESSURE: 106 MMHG

## 2020-01-10 DIAGNOSIS — F29 PSYCHOSIS, UNSPECIFIED PSYCHOSIS TYPE (H): ICD-10-CM

## 2020-01-10 PROCEDURE — G0463 HOSPITAL OUTPT CLINIC VISIT: HCPCS | Mod: ZF

## 2020-01-10 RX ORDER — QUETIAPINE FUMARATE 200 MG/1
200 TABLET, FILM COATED ORAL AT BEDTIME
Qty: 90 TABLET | Refills: 0 | Status: SHIPPED | OUTPATIENT
Start: 2020-01-10 | End: 2020-04-09

## 2020-01-10 ASSESSMENT — PAIN SCALES - GENERAL: PAINLEVEL: NO PAIN (0)

## 2020-01-10 NOTE — PATIENT INSTRUCTIONS
Thank you for coming to the PSYCHIATRY CLINIC.    Lab Testing:  If you had lab testing today and your results are reassuring or normal they will be mailed to you or sent through [employer] within 7 days.   If the lab tests need quick action we will call you with the results.  The phone number we will call with results is # [phone] (home) . If this is not the best number please call our clinic and change the number.    Medication Refills:  If you need any refills please call your pharmacy and they will contact us. Our fax number for refills is [phone]. Please allow three business for refill processing.   If you need to  your refill at a new pharmacy, please contact the new pharmacy directly. The new pharmacy will help you get your medications transferred.     Scheduling:  If you have any concerns about today's visit or wish to schedule another appointment please call our office during normal business hours [phone] (8-5:00 M-F)    Contact Us:  Please call [phone] during business hours (8-5:00 M-F).  If after clinic hours, or on the weekend, please call  [phone].    Financial Assistance [phone]  [facility] Billing [phone]  Central Billing Office, [facility]: [phone]  [facility] Billing [phone]  Medical Records [phone]      MENTAL HEALTH CRISIS NUMBERS:  [facility]:   [facility] - [phone]   Crisis Residence [facility] - [phone]   Walk-In Counseling [facility] [phone]   COPE 24/7 [place] Mobile Team for Adults - [[phone]]; Child - [[phone]]        [facility]:   [facility] - [phone]   Walk-in counseling [facility] - [phone]   Walk-in counseling [facility] - [phone]   Crisis Residence [facility] - [phone]   Urgent Care Adult Mental Health:   --Drop-in, 24/7 crisis line, and [place] Mobile Team  [[phone]]    CRISIS TEXT LINE: Text 955-442 from anywhere, anytime, any crisis 24/7;    OR SEE www.crisistextline.org     National Suicide Prevention Lifeline: 444-805-ASDL (438-101-4688) or dial 988    Poison Control Center - 7-111-326-2741    CHILD: [facility] needs assessment team - [phone]     [facility] Lifeline - 1-241.391.4688; or [facility] Lifeline - 1-647.211.4521    If you have a medical emergency please call 911or go to the nearest ER.                    _____________________________________________    Again thank you for choosing PSYCHIATRY CLINIC and please let us know how we can best partner with you to improve you and your family's health.  You may be receiving a survey regarding this appointment. We would love to have your feedback, both positive and negative. The survey is done by an external company, so your answers are anonymous.

## 2020-01-10 NOTE — PROGRESS NOTES
"    Psychiatry Clinic [facility]  PSYCHIATRY PROGRESS NOTE       Radha Fitch is a 74 year old female who returns to the clinic for follow-up care.  Last seen by Alyssia Winn CNP at [facility] psychiatric clinic on 10/26/18.  Care transferred due to Alyssia not being available on Fridays.   Therapist: None  PCP: Feliberto Zapata  Other Providers: ROXIE Zapata: PCP.  Shaka Awan: Nephrology.  VICKY Del Cid: Endocrinology    Pertinent Background:  See previous notes.  Psych critical item history includes psychosis [sxs include non-command auditory hallucinations] and mutiple psychotropic trials.     Interim History     [4, 4]     The patient is a good historian, reports good treatment adherence and was last seen 7/5/19.  Since the last visit, Radha reports she continues to experience auditory hallucinations but reports they are manageable via distraction.  Worse in morning.  No concerns with mood or sleep.  Sleep described as \"great.\"  Continues to endorse some concern with feeling tired.  Continues to be followed by Nephrology and Endocrinology (recent documentation reviewed).   Labs needed.  Draws occur at [facility] around the 10th of the month.      7/5/19: Radha transferred care from another NP in clinic due to her not being available on Fridays.  She was hospitalized on 3/20/19 at [facility] for 12 days due to hypercalcemia, hyperparathyroidism, hypomagnesemia, hypophosphatemia, and hypertension.  It appears she has attended the various medical follow-up appointments.  In regards to her mental health, Radha reports she does continue to hear voices daily, primarily in morning and bedtime.  Voices typically make negative and derogatory comments about her.  No command hallucinations.  No visual hallucinations.  No delusional thought content.  Currently taking Seroquel 200mg and has been several years.  Reports numerous previous antipsychotic trials.  Radha reports she \"would like the voices to go " "away and not to be on any pills and still be ok.\"  She does not want to adjust medications today as the Seroquel does help manage auditory hallucinations and helps with sleep promotion.  She does use various distraction skills to help manage breakthrough symptoms including online games, music, and reading.  No concerns with mood currently.  Fatigue is problematic and probably attributable to numerous medical issus and sedative qualities of Seroquel.      Aside from sedation, no other concerns with Seroquel.  Adherent to all medications.      Recent significant lab values (6/22/19)  A1C: 10.1  Creatinine: 1.47  GFR: 35    Recent Symptoms:   Depression:  low energy  Elevated:  none  Psychosis:  auditory hallucinations without commands [details in Interim History]  Anxiety:  periodic worry  Panic Attack:  none  Trauma Related:  none     Recent Substance Use:  Alcohol- no, denies frequent alcohol use , Cannabis- no  and Other Illicit Drugs-none        Social/ Family History      [1ea,1ea]            [per patient report]               FINANCIAL SUPPORT- inheritance and payment for babysitting her grandchildren       CHILDREN- 2 adult children.  6 grandchildren   LIVING SITUATION- Lives alone in [facility] in [place]      LEGAL- None  EARLY HISTORY/ EDUCATION- Grew up in [place], [place].  2 years of [employer] school and 2 years at [employer]  SOCIAL/ SPIRITUAL SUPPORT- daughter, high-school friends.  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 "     Medical Review of Systems         [2,10]   The remainder of the review of systems is noncontributory  Allergy    Cinnamon; Solifenacin; and Sulfa drugs  Current Medications        Current Outpatient Medications   Medication Sig Dispense Refill     amLODIPine (NORVASC) 5 MG tablet        amoxicillin (AMOXIL) 500 MG capsule Take 4 capsules by mouth       aspirin  MG tablet Take 81 mg by mouth daily        atorvastatin (LIPITOR) 40 MG tablet Take 40 mg by mouth daily       BREO ELLIPTA 200-25 MCG/INH Inhaler INL 1 PUFF PO QD  11     glipiZIDE (GLUCOTROL XL) 5 MG 24 hr tablet Take 5 mg by mouth       lisinopril (PRINIVIL/ZESTRIL) 10 MG tablet Take 10 mg by mouth       loratadine (CLARITIN) 10 MG tablet Take 1 tablet (10 mg) by mouth daily 30 tablet 1     magnesium oxide 200 MG TABS Take 200 tablets by mouth       methimazole (TAPAZOLE) 5 MG tablet Take 5 mg by mouth       metoprolol succinate ER (TOPROL-XL) 100 MG 24 hr tablet Take 100 mg by mouth daily       Omega-3 Fatty Acids (EQL OMEGA 3 FISH OIL) 1200 MG CAPS Take 1 capsule by mouth daily       omeprazole (PRILOSEC) 20 MG CR capsule Take 20 mg by mouth       pantoprazole sodium (PROTONIX) 40 MG packet Take 40 mg by mouth daily       predniSONE (DELTASONE) 5 MG tablet Take 5 mg by mouth       psyllium 28.3 % POWD Take 1 tsp. by mouth daily       QUEtiapine (SEROQUEL) 200 MG tablet TAKE 1 TABLET(200 MG) BY MOUTH AT BEDTIME 30 tablet 1     folic acid (FOLVITE) 1 MG tablet Take 1 tablet (1 mg) by mouth daily (Patient not taking: Reported on 7/5/2019) 90 tablet 0     Vitals         [3, 3]   /66   Pulse 73   Wt 74.5 kg (164 lb 3.2 oz)    Mental Status Exam        [9, 14 cog gs]     Alertness: alert  and oriented  Appearance: casually groomed  Behavior/Demeanor: cooperative, pleasant and calm, with good  eye contact   Speech: normal  Language: no problems  Psychomotor: normal or unremarkable  Mood: description consistent with euthymia  Affect: full range  and appropriate; was congruent to mood; was congruent to content  Thought Process/Associations: unremarkable  Thought Content:  Reports none;  Denies suicidal ideation and violent ideation  Perception:  Reports auditory hallucinations without commands [details in Interim History];  Denies visual hallucinations  Insight: adequate  Judgment: intact  Cognition: (6) does  appear grossly intact; formal cognitive testing was not done  Gait/Station and/or Muscle Strength/Tone: unremarkable    Labs and Data                          Rating Scales:    PHQ9    PHQ9 Today:  1  PHQ-9 SCORE 1/9/2015 5/8/2015 10/26/2018   PHQ-9 Total Score 1 0 -   PHQ-9 Total Score - - 0         Diagnosis      Psychosis, unspecified type (H)  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  She does not appear distressed.   A1C elevated, Creatinine elevated, and GFR abnormal.  Followed by internal medicine. She does not want to adjust medications.      [place] Prescription Monitoring [place] [] was not checked today:  not using controlled substances.        Plan                                                                                                                     m2, h3     1) MEDICATION:  Continue Seroquel 200mg    RTC: 6 months per Radha's request.  Advised to call clinic if experiences any concerns    CRISIS NUMBERS:   Provided routinely in AVS.    Treatment Risk Statement:  The patient understands the risks, benefits, adverse effects and alternatives. Agrees to treatment with the capacity to do so. No medical contraindications to treatment. Agrees to call clinic for any problems. The patient understands to call 911 or go to the nearest ED if life threatening or urgent symptoms occur.     WHODAS 2.0  TODAY total score = N/A; [a 12-item WHODAS 2.0 assessment was not completed by the pt today  and/or recorded in EPIC].       PROVIDER:  KANCHAN Zacarias CNP

## 2020-01-22 ASSESSMENT — PATIENT HEALTH QUESTIONNAIRE - PHQ9: SUM OF ALL RESPONSES TO PHQ QUESTIONS 1-9: 1

## 2020-04-09 ENCOUNTER — VIRTUAL VISIT (OUTPATIENT)
Dept: PSYCHIATRY | Facility: CLINIC | Age: 75
End: 2020-04-09
Attending: NURSE PRACTITIONER
Payer: COMMERCIAL

## 2020-04-09 DIAGNOSIS — F29 PSYCHOSIS, UNSPECIFIED PSYCHOSIS TYPE (H): ICD-10-CM

## 2020-04-09 RX ORDER — QUETIAPINE FUMARATE 200 MG/1
200 TABLET, FILM COATED ORAL AT BEDTIME
Qty: 90 TABLET | Refills: 0 | Status: SHIPPED | OUTPATIENT
Start: 2020-04-09 | End: 2020-07-09

## 2020-04-09 NOTE — PROGRESS NOTES
"    Psychiatry Clinic [facility]  PSYCHIATRY PROGRESS NOTE       Radha Fitch is a 74 year old female who returns to the clinic for follow-up care.  Last seen by Alyssia Winn CNP at [facility] psychiatric clinic on 10/26/18.  Care transferred due to Alyssia not being available on Fridays.   Therapist: None  PCP: Feliberto Zapata  Other Providers: ROXIE Zapata: PCP.  Shaka Awan: Nephrology.  Nehemias, N: Endocrinology    Pertinent Background:  See previous notes.  Psych critical item history includes psychosis [sxs include non-command auditory hallucinations] and mutiple psychotropic trials.     Interim History     [4, 4]     The patient is a good historian, reports good treatment adherence and was last seen 1/10/20.  Since the last visit, Radha reports she is \"doing good.\"  Reports feels less sedated from medication.  Continues to work with other providers.   Sleep is \"really good.\"  Continues to experience auditory hallucinations but \"manageable.\"  \"I have never had an intolerable day.\"  Does not want to increase Seroquel or augment due to possible side effects.      1/10/20: Radha reports she continues to experience auditory hallucinations but reports they are manageable via distraction.  Worse in morning.  No concerns with mood or sleep.  Sleep described as \"great.\"  Continues to endorse some concern with feeling tired.  Continues to be followed by Nephrology and Endocrinology (recent documentation reviewed).   Labs needed.  Draws occur at [facility] around the 10th of the month.      7/5/19: Radha transferred care from another NP in clinic due to her not being available on Fridays.  She was hospitalized on 3/20/19 at [facility] for 12 days due to hypercalcemia, hyperparathyroidism, hypomagnesemia, hypophosphatemia, and hypertension.  It appears she has attended the various medical follow-up appointments.  In regards to her mental health, Radha reports she does continue to hear voices daily, " "primarily in morning and bedtime.  Voices typically make negative and derogatory comments about her.  No command hallucinations.  No visual hallucinations.  No delusional thought content.  Currently taking Seroquel 200mg and has been several years.  Reports numerous previous antipsychotic trials.  Radha reports she \"would like the voices to go away and not to be on any pills and still be ok.\"  She does not want to adjust medications today as the Seroquel does help manage auditory hallucinations and helps with sleep promotion.  She does use various distraction skills to help manage breakthrough symptoms including online games, music, and reading.  No concerns with mood currently.  Fatigue is problematic and probably attributable to numerous medical issus and sedative qualities of Seroquel.      Aside from sedation, no other concerns with Seroquel.  Adherent to all medications.      Recent significant lab values (6/22/19)  A1C: 10.1  Creatinine: 1.47  GFR: 35    Recent Symptoms:   Depression:  low energy  Elevated:  none  Psychosis:  auditory hallucinations without commands [details in Interim History]  Anxiety:  periodic worry  Panic Attack:  none  Trauma Related:  none     Recent Substance Use:  Alcohol- no, denies frequent alcohol use , Cannabis- no  and Other Illicit Drugs-none        Social/ Family History      [1ea,1ea]            [per patient report]               FINANCIAL SUPPORT- inheritance and payment for babysitting her grandchildren       CHILDREN- 2 adult children.  6 grandchildren   LIVING SITUATION- Lives alone in [facility] in [place]      LEGAL- None  EARLY HISTORY/ EDUCATION- Grew up in [place], [place].  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  SOCIAL/ SPIRITUAL SUPPORT- daughter, high-school friends.  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     Medical Review of Systems         [2,10]   The remainder of the review of systems is noncontributory  Allergy    Cinnamon; Solifenacin; and Sulfa drugs  Current Medications        Current Outpatient Medications   Medication Sig Dispense Refill     amLODIPine (NORVASC) 5 MG tablet        amoxicillin (AMOXIL) 500 MG capsule Take 4 capsules by mouth       aspirin  MG tablet Take 81 mg by mouth daily        atorvastatin (LIPITOR) 40 MG tablet Take 40 mg by mouth daily       BREO ELLIPTA 200-25 MCG/INH Inhaler INL 1 PUFF PO QD  11     folic acid (FOLVITE) 1 MG tablet Take 1 tablet (1 mg) by mouth daily (Patient not taking: Reported on 7/5/2019) 90 tablet 0     glipiZIDE (GLUCOTROL XL) 5 MG 24 hr tablet Take 5 mg by mouth       lisinopril (PRINIVIL/ZESTRIL) 10 MG tablet Take 10 mg by mouth       loratadine (CLARITIN) 10 MG tablet Take 1 tablet (10 mg) by mouth daily 30 tablet 1     magnesium oxide 200 MG TABS Take 200 tablets by mouth       methimazole (TAPAZOLE) 5 MG tablet Take 5 mg by mouth       metoprolol succinate ER (TOPROL-XL) 100 MG 24 hr tablet Take 100 mg by mouth daily       Omega-3 Fatty Acids (EQL OMEGA 3 FISH OIL) 1200 MG CAPS Take 1 capsule by mouth daily       omeprazole (PRILOSEC) 20 MG CR capsule Take 20 mg by mouth       pantoprazole sodium (PROTONIX) 40 MG packet Take 40 mg by mouth daily       predniSONE (DELTASONE) 5 MG tablet Take 5 mg by mouth       psyllium 28.3 % POWD Take 1 tsp. by mouth daily       QUEtiapine (SEROQUEL) 200 MG tablet Take 1 tablet (200 mg) by mouth At Bedtime 90 tablet 0     Vitals         [3, 3]   There were no vitals taken  "for this visit.   Mental Status Exam        [9, 14 cog gs]     Alertness: alert  and oriented  Appearance: unable to assess  Behavior/Demeanor: cooperative and pleasant, with unable to assess eye contact   Speech: normal  Language: good  Psychomotor: unable to assess  Mood: \"good\"  Affect: unable to assess; was congruent to mood; was congruent to content  Thought Process/Associations: unremarkable  Thought Content:  Reports none;  Denies suicidal ideation and violent ideation  Perception:  Reports auditory hallucinations without commands [details in Interim History];  Denies visual hallucinations  Insight: adequate  Judgment: intact  Cognition: (6) does  appear grossly intact; formal cognitive testing was not done  Gait/Station and/or Muscle Strength/Tone: unable to assess    Labs and Data                          Rating Scales:    PHQ9    PHQ9 Today:  Not completed  PHQ-9 SCORE 5/8/2015 10/26/2018 1/10/2020   PHQ-9 Total Score 0 - -   PHQ-9 Total Score - 0 1         Diagnosis      Psychosis, unspecified type (H)  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        Plan                                                                                                                     m2, h3     1) MEDICATION:  Continue Seroquel 200mg    RTC: 6 months per Radha's request.  Advised to call clinic if experiences any concerns    CRISIS NUMBERS:   Provided routinely in AVS.    Treatment Risk Statement:  The patient understands the risks, benefits, adverse effects and alternatives. Agrees to treatment with the capacity to do so. No medical contraindications to treatment. Agrees to call clinic for any problems. The patient understands to call 911 or go to the nearest ED if life threatening or urgent symptoms occur.     WHODAS 2.0  TODAY total score = N/A; " [a 12-item WHODAS 2.0 assessment was not completed by the pt today and/or recorded in EPIC].       PROVIDER:  KANCHAN Zacarias CNP    TELEPHONE VISIT  Radha Fitch is a 74 year old pt. who is being evaluated via a billable telephone visit.      The patient has been notified of the following:    We have found that certain health care needs can be provided without the need for a physical exam. This service lets us provide the care you need with a short phone conversation. If a prescription is necessary we can send it directly to your pharmacy. If lab work is needed we can place an order for that and you can then stop by our lab to have the test done at a later time. Insurers are generally covering virtual visits as they would in-office visits so billing should not be different than normal.  If for some reason you do get billed incorrectly, you should contact the billing office to correct it and that number is in the AVS .    Patient has given verbal consent for a telephone visit?:  Yes   How would the pt like to obtain the AVS?:  not requested  AVS SmartPhrase [PsychAVS] has been placed in 'Patient Instructions':  unknown     Start Time:  11:18am          End Time:  11:33am

## 2020-07-06 DIAGNOSIS — F29 PSYCHOSIS, UNSPECIFIED PSYCHOSIS TYPE (H): ICD-10-CM

## 2020-07-09 RX ORDER — QUETIAPINE FUMARATE 200 MG/1
TABLET, FILM COATED ORAL
Qty: 30 TABLET | Refills: 0 | Status: SHIPPED | OUTPATIENT
Start: 2020-07-09 | End: 2020-08-11

## 2020-07-09 NOTE — TELEPHONE ENCOUNTER
QUEtiapine (SEROQUEL) 200 MG   Last refilled: 4/9/20  Qty: 90  Last seen:  4/9/20  RTC: 6 MOS  Cancel: 0  No-show: 0  Next appt: NONE  Refill decision: 30 day margot refill sent to the pharmacy - including instructions for patient to call the clinic and schedule an appointment.  Scheduling has been notified to contact the pt for appointment.

## 2020-08-06 DIAGNOSIS — F29 PSYCHOSIS, UNSPECIFIED PSYCHOSIS TYPE (H): ICD-10-CM

## 2020-08-11 RX ORDER — QUETIAPINE FUMARATE 200 MG/1
200 TABLET, FILM COATED ORAL AT BEDTIME
Qty: 30 TABLET | Refills: 0 | Status: SHIPPED | OUTPATIENT
Start: 2020-08-11 | End: 2020-08-27

## 2020-08-11 NOTE — TELEPHONE ENCOUNTER
Medication requested: QUETIAPINE 200MG TABLETS  TAKE 1 TABLET BY MOUTH EVERY NIGHT AT BEDTIME   Last refilled: 7/9/20  Qty: 30/0      Last seen: 4/9/20  RTC: 6 months  Cancel: 0  No-show: 0  Next appt: Not scheduled, due October.    Refill decision:   Refilled for 30 days per protocol.

## 2020-08-27 ENCOUNTER — VIRTUAL VISIT (OUTPATIENT)
Dept: PSYCHIATRY | Facility: CLINIC | Age: 75
End: 2020-08-27
Attending: NURSE PRACTITIONER
Payer: COMMERCIAL

## 2020-08-27 DIAGNOSIS — F29 PSYCHOSIS, UNSPECIFIED PSYCHOSIS TYPE (H): ICD-10-CM

## 2020-08-27 RX ORDER — QUETIAPINE FUMARATE 200 MG/1
200 TABLET, FILM COATED ORAL AT BEDTIME
Qty: 90 TABLET | Refills: 1 | Status: SHIPPED | OUTPATIENT
Start: 2020-08-27 | End: 2021-02-17

## 2020-08-27 NOTE — PROGRESS NOTES
"TELEPHONE VISIT  Radha Fitch is a 74 year old pt. who is being evaluated via a billable telephone visit.      The patient has been notified of the following:    We have found that certain health care needs can be provided without the need for a physical exam. This service lets us provide the care you need with a short phone conversation. If a prescription is necessary we can send it directly to your pharmacy. If lab work is needed we can place an order for that and you can then stop by our lab to have the test done at a later time. Insurers are generally covering virtual visits as they would in-office visits so billing should not be different than normal.  If for some reason you do get billed incorrectly, you should contact the billing office to correct it and that number is in the AVS .    Patient has given verbal consent for a telephone visit?:  Yes   How would the pt like to obtain the AVS?:  not requested  AVS SmartPhrase [PsychAVS] has been placed in 'Patient Instructions':  N/A     Start Time:  2:21pm          End Time:  2:40pm        Psychiatry Clinic [facility]  PSYCHIATRY PROGRESS NOTE       Radha Fitch is a 74 year old female who returns to the clinic for follow-up care.  Last seen by Alyssia Winn CNP at [facility] psychiatric clinic on 10/26/18.  Care transferred due to Alyssia not being available on Fridays.   Therapist: None  PCP: Feliberto Zapata  Other Providers: ROXIE Zapata: PCP.  Shaka Awan: Nephrology.  Nehemias, N: Endocrinology    Pertinent Background:  See previous notes.  Psych critical item history includes psychosis [sxs include non-command auditory hallucinations] and mutiple psychotropic trials.     Interim History     [4, 4]     The patient is a good historian, reports good treatment adherence and was last seen 4/9/20.  Since the last visit, Radha reports she is \"good.\"  She expresses some concern with \"forgetfulness.\"  No changes to symptoms.  Uses earplugs to manage AH, " "which is low intensity and frequency.  No concerns with sleep.  Continues to endorse the support from her son and daughter have been very helpful.  She does not want to adjust medications    4/9/20: Radha reports she is \"doing good.\"  Reports feels less sedated from medication.  Continues to work with other providers.   Sleep is \"really good.\"  Continues to experience auditory hallucinations but \"manageable.\"  \"I have never had an intolerable day.\"  Does not want to increase Seroquel or augment due to possible side effects.      1/10/20: Radha reports she continues to experience auditory hallucinations but reports they are manageable via distraction.  Worse in morning.  No concerns with mood or sleep.  Sleep described as \"great.\"  Continues to endorse some concern with feeling tired.  Continues to be followed by Nephrology and Endocrinology (recent documentation reviewed).   Labs needed.  Draws occur at [facility] around the 10th of the month.      7/5/19: Radha transferred care from another NP in clinic due to her not being available on Fridays.  She was hospitalized on 3/20/19 at [facility] for 12 days due to hypercalcemia, hyperparathyroidism, hypomagnesemia, hypophosphatemia, and hypertension.  It appears she has attended the various medical follow-up appointments.  In regards to her mental health, Radha reports she does continue to hear voices daily, primarily in morning and bedtime.  Voices typically make negative and derogatory comments about her.  No command hallucinations.  No visual hallucinations.  No delusional thought content.  Currently taking Seroquel 200mg and has been several years.  Reports numerous previous antipsychotic trials.  Radha reports she \"would like the voices to go away and not to be on any pills and still be ok.\"  She does not want to adjust medications today as the Seroquel does help manage auditory hallucinations and helps with sleep promotion.  She does use various " distraction skills to help manage breakthrough symptoms including online games, music, and reading.  No concerns with mood currently.  Fatigue is problematic and probably attributable to numerous medical issus and sedative qualities of Seroquel.      Aside from sedation, no other concerns with Seroquel.  Adherent to all medications.      Recent significant lab values (6/22/19)  A1C: 10.1  Creatinine: 1.47  GFR: 35    Recent Symptoms:   Depression:  low energy  Elevated:  none  Psychosis:  auditory hallucinations without commands [details in Interim History]  Anxiety:  periodic worry  Panic Attack:  none  Trauma Related:  none     Recent Substance Use:  Alcohol- no, denies frequent alcohol use , Cannabis- no  and Other Illicit Drugs-none        Social/ Family History      [1ea,1ea]            [per patient report]               FINANCIAL SUPPORT- inheritance and payment for babysitting her grandchildren       CHILDREN- 2 adult children.  6 grandchildren   LIVING SITUATION- Lives alone in [facility] in [place]      LEGAL- None  EARLY HISTORY/ EDUCATION- Grew up in [place], [place].  2 years of [employer] school and 2 years at [employer]  SOCIAL/ SPIRITUAL SUPPORT- daughter, high-school friends.  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     Medical Review of Systems         [2,10]   The remainder of the review of systems is noncontributory  Allergy    Cinnamon; Solifenacin; and Sulfa drugs  Current Medications        Current Outpatient  "Medications   Medication Sig Dispense Refill     amLODIPine (NORVASC) 5 MG tablet        amoxicillin (AMOXIL) 500 MG capsule Take 4 capsules by mouth       aspirin  MG tablet Take 81 mg by mouth daily        atorvastatin (LIPITOR) 40 MG tablet Take 40 mg by mouth daily       BREO ELLIPTA 200-25 MCG/INH Inhaler INL 1 PUFF PO QD  11     folic acid (FOLVITE) 1 MG tablet Take 1 tablet (1 mg) by mouth daily (Patient not taking: Reported on 7/5/2019) 90 tablet 0     glipiZIDE (GLUCOTROL XL) 5 MG 24 hr tablet Take 5 mg by mouth       lisinopril (PRINIVIL/ZESTRIL) 10 MG tablet Take 10 mg by mouth       loratadine (CLARITIN) 10 MG tablet Take 1 tablet (10 mg) by mouth daily 30 tablet 1     magnesium oxide 200 MG TABS Take 200 tablets by mouth       methimazole (TAPAZOLE) 5 MG tablet Take 5 mg by mouth       metoprolol succinate ER (TOPROL-XL) 100 MG 24 hr tablet Take 100 mg by mouth daily       Omega-3 Fatty Acids (EQL OMEGA 3 FISH OIL) 1200 MG CAPS Take 1 capsule by mouth daily       omeprazole (PRILOSEC) 20 MG CR capsule Take 20 mg by mouth       pantoprazole sodium (PROTONIX) 40 MG packet Take 40 mg by mouth daily       predniSONE (DELTASONE) 5 MG tablet Take 5 mg by mouth       psyllium 28.3 % POWD Take 1 tsp. by mouth daily       QUEtiapine (SEROQUEL) 200 MG tablet Take 1 tablet (200 mg) by mouth At Bedtime 30 tablet 0     Vitals         [3, 3]   There were no vitals taken for this visit.   Mental Status Exam        [9, 14 cog gs]     Alertness: alert  and oriented  Appearance: unable to assess  Behavior/Demeanor: cooperative and pleasant, with unable to assess eye contact   Speech: regular rate and rhythm  Language: no problems  Psychomotor: unable to assess  Mood: \"good\"  Affect: unable to assess; was congruent to mood; was congruent to content  Thought Process/Associations: unremarkable  Thought Content:  Reports none;  Denies suicidal ideation and violent ideation  Perception:  Reports auditory hallucinations " without commands [details in Interim History];  Denies visual hallucinations  Insight: adequate  Judgment: intact  Cognition: (6) does  appear grossly intact; formal cognitive testing was not done  Gait/Station and/or Muscle Strength/Tone: unable to assess    Labs and Data                          Rating Scales:    PHQ9    PHQ9 Today:  Not completed  PHQ-9 SCORE 5/8/2015 10/26/2018 1/10/2020   PHQ-9 Total Score 0 - -   PHQ-9 Total Score - 0 1         Diagnosis      Psychosis, unspecified type (H)  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        Plan                                                                                                                     m2, h3     1) MEDICATION:  Continue Seroquel 200mg    RTC: 6 months per Radha's request.  Advised to call clinic if experiences any concerns    CRISIS NUMBERS:   Provided routinely in AVS.    Treatment Risk Statement:  The patient understands the risks, benefits, adverse effects and alternatives. Agrees to treatment with the capacity to do so. No medical contraindications to treatment. Agrees to call clinic for any problems. The patient understands to call 911 or go to the nearest ED if life threatening or urgent symptoms occur.     WHODAS 2.0  TODAY total score = N/A; [a 12-item WHODAS 2.0 assessment was not completed by the pt today and/or recorded in EPIC].       PROVIDER:  KANCHAN Zacarias CNP

## 2021-02-15 DIAGNOSIS — F29 PSYCHOSIS, UNSPECIFIED PSYCHOSIS TYPE (H): ICD-10-CM

## 2021-02-17 RX ORDER — QUETIAPINE FUMARATE 200 MG/1
200 TABLET, FILM COATED ORAL AT BEDTIME
Qty: 30 TABLET | Refills: 0 | Status: SHIPPED | OUTPATIENT
Start: 2021-02-17 | End: 2021-03-11

## 2021-02-17 NOTE — TELEPHONE ENCOUNTER
QUEtiapine (SEROQUEL) 200 MG  Last refilled: 8/27/20  Qty: 90  :1    Last seen: 8/27/20  RTC: 6 MOS  Cancel: 0   No-show: 0  Next appt: NONE  Scheduling has been notified to contact the pt for appointment.  Refill decision: 30 day margot refill sent to the pharmacy - including instructions for patient to call the clinic and schedule an appointment.

## 2021-02-18 ENCOUNTER — TELEPHONE (OUTPATIENT)
Dept: PSYCHIATRY | Facility: CLINIC | Age: 76
End: 2021-02-18

## 2021-02-18 DIAGNOSIS — F29 PSYCHOSIS, UNSPECIFIED PSYCHOSIS TYPE (H): ICD-10-CM

## 2021-02-18 NOTE — TELEPHONE ENCOUNTER
[facility] Call Center    Phone Message    May a detailed message be left on voicemail: yes     Reason for Call: Medication Refill Request    Has the patient contacted the pharmacy for the refill? Yes   Name of medication being requested: Quetiapine  Provider who prescribed the medication: Mika Jean  Pharmacy: [facility] [place]  Date medication is needed: Pt has 3 pills left but her daughter helps her to  her medication, so she's hoping to pick it up tomorrow. Pts next appt with Mika is on 3/3.          Action Taken: Other: p [facility]    Travel Screening: Not Applicable

## 2021-03-11 ENCOUNTER — VIRTUAL VISIT (OUTPATIENT)
Dept: PSYCHIATRY | Facility: CLINIC | Age: 76
End: 2021-03-11
Attending: NURSE PRACTITIONER
Payer: COMMERCIAL

## 2021-03-11 DIAGNOSIS — F29 PSYCHOSIS, UNSPECIFIED PSYCHOSIS TYPE (H): ICD-10-CM

## 2021-03-11 DIAGNOSIS — Z79.899 ENCOUNTER FOR LONG-TERM (CURRENT) USE OF MEDICATIONS: Primary | ICD-10-CM

## 2021-03-11 PROCEDURE — 99442 PR PHYSICIAN TELEPHONE EVALUATION 11-20 MIN: CPT | Mod: 95 | Performed by: NURSE PRACTITIONER

## 2021-03-11 RX ORDER — QUETIAPINE FUMARATE 200 MG/1
200 TABLET, FILM COATED ORAL AT BEDTIME
Qty: 90 TABLET | Refills: 1 | Status: SHIPPED | OUTPATIENT
Start: 2021-03-11 | End: 2021-09-09

## 2021-03-11 NOTE — PROGRESS NOTES
"TELEPHONE VISIT  Radha Fitch is a 74 year old pt. who is being evaluated via a billable telephone visit.      The patient has been notified of the following:    We have found that certain health care needs can be provided without the need for a physical exam. This service lets us provide the care you need with a short phone conversation. If a prescription is necessary we can send it directly to your pharmacy. If lab work is needed we can place an order for that and you can then stop by our lab to have the test done at a later time. Insurers are generally covering virtual visits as they would in-office visits so billing should not be different than normal.  If for some reason you do get billed incorrectly, you should contact the billing office to correct it and that number is in the AVS .    Patient has given verbal consent for a telephone visit?:  Yes   How would the pt like to obtain the AVS?:  not requested  AVS SmartPhrase [PsychAVS] has been placed in 'Patient Instructions':  N/A     Start Time:  10:04am          End Time:  10:24am        Psychiatry Clinic [facility]  PSYCHIATRY PROGRESS NOTE       Radha Fitch is a 75 year old female who returns to the clinic for follow-up care.  Last seen by Alyssia Winn CNP at [facility] psychiatric clinic on 10/26/18.  Care transferred due to Alyssia not being available on Fridays.   Therapist: None  PCP: Feliberto Zapata  Other Providers: ROXIE Zapata: PCP.  Shaka Awan: Nephrology.  Nehemias, N: Endocrinology    Pertinent Background:  See previous notes.  Psych critical item history includes psychosis [sxs include non-command auditory hallucinations] and mutiple psychotropic trials.     Interim History     [4, 4]     The patient is a good historian, reports good treatment adherence and was last seen 8/27/20.  Since the last visit, Radah again she reports she is \"good.\"  Has been spending more time with grandson due to Covid changes.  Very pleasant and " "appropriately talkative.  Continues to use earplugs to manage occasional AH. Radha is not concerned or overly bothered by symptoms.  No concerns with sleep.  She does not want to adjust medications.      8/27/20: Radha reports she is \"good.\"  She expresses some concern with \"forgetfulness.\"  No changes to symptoms.  Uses earplugs to manage AH, which is low intensity and frequency.  No concerns with sleep.  Continues to endorse the support from her son and daughter have been very helpful.  She does not want to adjust medications    4/9/20: Radha reports she is \"doing good.\"  Reports feels less sedated from medication.  Continues to work with other providers.   Sleep is \"really good.\"  Continues to experience auditory hallucinations but \"manageable.\"  \"I have never had an intolerable day.\"  Does not want to increase Seroquel or augment due to possible side effects.      1/10/20: Radha reports she continues to experience auditory hallucinations but reports they are manageable via distraction.  Worse in morning.  No concerns with mood or sleep.  Sleep described as \"great.\"  Continues to endorse some concern with feeling tired.  Continues to be followed by Nephrology and Endocrinology (recent documentation reviewed).   Labs needed.  Draws occur at [facility] around the 10th of the month.      7/5/19: Radha transferred care from another NP in clinic due to her not being available on Fridays.  She was hospitalized on 3/20/19 at [facility] for 12 days due to hypercalcemia, hyperparathyroidism, hypomagnesemia, hypophosphatemia, and hypertension.  It appears she has attended the various medical follow-up appointments.  In regards to her mental health, Radha reports she does continue to hear voices daily, primarily in morning and bedtime.  Voices typically make negative and derogatory comments about her.  No command hallucinations.  No visual hallucinations.  No delusional thought content.  Currently taking " "Seroquel 200mg and has been several years.  Reports numerous previous antipsychotic trials.  Radha reports she \"would like the voices to go away and not to be on any pills and still be ok.\"  She does not want to adjust medications today as the Seroquel does help manage auditory hallucinations and helps with sleep promotion.  She does use various distraction skills to help manage breakthrough symptoms including online games, music, and reading.  No concerns with mood currently.  Fatigue is problematic and probably attributable to numerous medical issus and sedative qualities of Seroquel.      Aside from sedation, no other concerns with Seroquel.  Adherent to all medications.      Recent significant lab values (6/22/19)  A1C: 10.1  Creatinine: 1.47  GFR: 35    Recent Symptoms:   Depression:  not endorsed today  Elevated:  none  Psychosis:  auditory hallucinations without commands [details in Interim History]  Anxiety:  periodic worry  Panic Attack:  none  Trauma Related:  none     Recent Substance Use:  Alcohol- no, denies frequent alcohol use , Cannabis- no  and Other Illicit Drugs-none        Social/ Family History      [1ea,1ea]            [per patient report]               FINANCIAL SUPPORT- inheritance and payment for babysitting her grandchildren       CHILDREN- 2 adult children.  6 grandchildren   LIVING SITUATION- Lives alone in [facility] in [place]      LEGAL- None  EARLY HISTORY/ EDUCATION- Grew up in [place], [place].  2 years of [employer] school and 2 years at [employer]  SOCIAL/ SPIRITUAL SUPPORT- daughter, high-school friends.  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     Medical Review of Systems         [2,10]   The remainder of the review of systems is noncontributory  Allergy    Cinnamon, Solifenacin, and Sulfa drugs  Current Medications        Current Outpatient Medications   Medication Sig Dispense Refill     amLODIPine (NORVASC) 5 MG tablet        amoxicillin (AMOXIL) 500 MG capsule Take 4 capsules by mouth       aspirin  MG tablet Take 81 mg by mouth daily        atorvastatin (LIPITOR) 40 MG tablet Take 40 mg by mouth daily       BREO ELLIPTA 200-25 MCG/INH Inhaler INL 1 PUFF PO QD  11     folic acid (FOLVITE) 1 MG tablet Take 1 tablet (1 mg) by mouth daily (Patient not taking: Reported on 7/5/2019) 90 tablet 0     glipiZIDE (GLUCOTROL XL) 5 MG 24 hr tablet Take 5 mg by mouth       lisinopril (PRINIVIL/ZESTRIL) 10 MG tablet Take 10 mg by mouth       loratadine (CLARITIN) 10 MG tablet Take 1 tablet (10 mg) by mouth daily 30 tablet 1     magnesium oxide 200 MG TABS Take 200 tablets by mouth       methimazole (TAPAZOLE) 5 MG tablet Take 5 mg by mouth       metoprolol succinate ER (TOPROL-XL) 100 MG 24 hr tablet Take 100 mg by mouth daily       Omega-3 Fatty Acids (EQL OMEGA 3 FISH OIL) 1200 MG CAPS Take 1 capsule by mouth daily       omeprazole (PRILOSEC) 20 MG CR capsule Take 20 mg by mouth       pantoprazole sodium (PROTONIX) 40 MG packet Take 40 mg by mouth daily       predniSONE (DELTASONE) 5 MG tablet Take 5 mg by mouth       psyllium 28.3 % POWD Take 1 tsp. by mouth daily       QUEtiapine (SEROQUEL) 200 MG tablet Take 1 tablet (200 mg) by mouth At Bedtime *SCHEDULE CLINIC APPT. FOR REFILLS* 30 tablet 0     Vitals         [3, 3]   There were no vitals taken for this visit.   Mental Status Exam        [9, 14 cog gs]     Alertness: alert  and oriented  Appearance: unable to assess  Behavior/Demeanor: cooperative  "and pleasant, with unable to assess eye contact   Speech: normal  Language: good  Psychomotor: unable to assess  Mood: \"good\"  Affect: unable to assess; was congruent to mood; was congruent to content  Thought Process/Associations: unremarkable  Thought Content:  Reports none;  Denies suicidal ideation and violent ideation  Perception:  Reports auditory hallucinations without commands [details in Interim History];  Denies visual hallucinations  Insight: adequate  Judgment: intact  Cognition: (6) does  appear grossly intact; formal cognitive testing was not done  Gait/Station and/or Muscle Strength/Tone: unable to assess    Labs and Data                          Rating Scales:    PHQ9    PHQ9 Today:  Not completed  PHQ-9 SCORE 5/8/2015 10/26/2018 1/10/2020   PHQ-9 Total Score 0 - -   PHQ-9 Total Score - 0 1         Diagnosis      Psychosis, unspecified type (H)  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        Plan                                                                                                                     m2, h3     1) MEDICATION:  Continue Seroquel 200mg    RTC: 6 months per Radha's request.  Advised to call clinic if experiences any concerns    CRISIS NUMBERS:   Provided routinely in AVS.    Treatment Risk Statement:  The patient understands the risks, benefits, adverse effects and alternatives. Agrees to treatment with the capacity to do so. No medical contraindications to treatment. Agrees to call clinic for any problems. The patient understands to call 911 or go to the nearest ED if life threatening or urgent symptoms occur.     WHODAS 2.0  TODAY total score = N/A; [a 12-item WHODAS 2.0 assessment was not completed by the pt today and/or recorded in EPIC].       PROVIDER:  KANCHAN Zacarias CNP    "

## 2021-09-07 DIAGNOSIS — F29 PSYCHOSIS, UNSPECIFIED PSYCHOSIS TYPE (H): ICD-10-CM

## 2021-09-09 RX ORDER — QUETIAPINE FUMARATE 200 MG/1
TABLET, FILM COATED ORAL
Qty: 30 TABLET | Refills: 0 | Status: SHIPPED | OUTPATIENT
Start: 2021-09-09 | End: 2021-09-29

## 2021-09-09 NOTE — TELEPHONE ENCOUNTER
Last seen: 3/11/21  RTC: 6 months  Cancel: none  No-show: none  Next appt: 9/29/21     Medication requested: QUEtiapine (SEROQUEL) 200 MG tablet  Directions: Take 1 tablet (200 mg) by mouth At Bedtime  Qty: 90  Last refilled: approximately 6/11/21 per med tab     Medication refill approved per refill protocol    30 d/s sent to pt's pharmacy    Called the pt and notified her of the refill

## 2021-09-09 NOTE — TELEPHONE ENCOUNTER
Shaneka Peres, RN  Shaneka Peres, RN  Phone Number: [phone]          Previous Messages       ----- Message -----   From: Liseth Arias   Sent: 9/9/2021  11:44 AM CDT   To: [facility] Psychiatry [facility]   Subject: Rx Refill - [place]                             Caller:  Radha Fitch   Relationship to pt: self   Medication:   Quetiapine   How many days left of med do you have left (if >3 day supply, redirect to pharmacy): 2   Pharmacy and location: [id number] [place] [address] [facility] [address] (Ph: [phone])   Pending appt date:  09/29/21   Okay to leave detailed VM:  Yes

## 2021-09-29 ENCOUNTER — VIRTUAL VISIT (OUTPATIENT)
Dept: PSYCHIATRY | Facility: CLINIC | Age: 76
End: 2021-09-29
Attending: NURSE PRACTITIONER
Payer: COMMERCIAL

## 2021-09-29 DIAGNOSIS — F29 PSYCHOSIS, UNSPECIFIED PSYCHOSIS TYPE (H): ICD-10-CM

## 2021-09-29 PROCEDURE — 99213 OFFICE O/P EST LOW 20 MIN: CPT | Mod: 95 | Performed by: NURSE PRACTITIONER

## 2021-09-29 RX ORDER — QUETIAPINE FUMARATE 200 MG/1
TABLET, FILM COATED ORAL
Qty: 90 TABLET | Refills: 1 | Status: SHIPPED | OUTPATIENT
Start: 2021-09-29 | End: 2022-03-25

## 2021-09-29 NOTE — PROGRESS NOTES
"TELEPHONE VISIT  Radha Fitch is a 74 year old pt. who is being evaluated via a billable telephone visit.      The patient has been notified of the following:    We have found that certain health care needs can be provided without the need for a physical exam. This service lets us provide the care you need with a short phone conversation. If a prescription is necessary we can send it directly to your pharmacy. If lab work is needed we can place an order for that and you can then stop by our lab to have the test done at a later time. Insurers are generally covering virtual visits as they would in-office visits so billing should not be different than normal.  If for some reason you do get billed incorrectly, you should contact the billing office to correct it and that number is in the AVS .    Patient has given verbal consent for a telephone visit?:  Yes   How would the pt like to obtain the AVS?:  not requested  AVS SmartPhrase [PsychAVS] has been placed in 'Patient Instructions':  N/A     Start Time:  1:47pm          End Time:  1:03pm        Psychiatry Clinic [facility]  PSYCHIATRY PROGRESS NOTE       Radha Fitch is a 75 year old female who returns to the clinic for follow-up care.  Last seen by Alyssia Winn CNP at [facility] psychiatric clinic on 10/26/18.  Care transferred due to Alyssia not being available on Fridays.   Therapist: None  PCP: Feliberto Zapata  Other Providers: ROXIE Zapata: PCP.  Shaka Awan: Nephrology.  Nehemias, N: Endocrinology    Pertinent Background:  See previous notes.  Psych critical item history includes psychosis [sxs include non-command auditory hallucinations] and mutiple psychotropic trials.     Interim History     [4, 4]     The patient is a good historian, reports good treatment adherence and was last seen 3/11/21.  Since the last visit, Radha reports she is \"good.\" Very pleasant and talkative.  She does endorse a brief concerns with falling asleep.  Fortunately " "symptoms have resolved.  Granddaughter moved in with her.  Hospitalized in May for day due to N-V-D.  Pertinent labs completed and reviewed.      3/11/21: Radha again she reports she is \"good.\"  Has been spending more time with grandson due to Covid changes.  Very pleasant and appropriately talkative.  Continues to use earplugs to manage occasional AH. Radha is not concerned or overly bothered by symptoms.  No concerns with sleep.  She does not want to adjust medications.      8/27/20: Radha reports she is \"good.\"  She expresses some concern with \"forgetfulness.\"  No changes to symptoms.  Uses earplugs to manage AH, which is low intensity and frequency.  No concerns with sleep.  Continues to endorse the support from her son and daughter have been very helpful.  She does not want to adjust medications    4/9/20: Radha reports she is \"doing good.\"  Reports feels less sedated from medication.  Continues to work with other providers.   Sleep is \"really good.\"  Continues to experience auditory hallucinations but \"manageable.\"  \"I have never had an intolerable day.\"  Does not want to increase Seroquel or augment due to possible side effects.      1/10/20: Radha reports she continues to experience auditory hallucinations but reports they are manageable via distraction.  Worse in morning.  No concerns with mood or sleep.  Sleep described as \"great.\"  Continues to endorse some concern with feeling tired.  Continues to be followed by Nephrology and Endocrinology (recent documentation reviewed).   Labs needed.  Draws occur at [facility] around the 10th of the month.      7/5/19: Radha transferred care from another NP in clinic due to her not being available on Fridays.  She was hospitalized on 3/20/19 at [facility] for 12 days due to hypercalcemia, hyperparathyroidism, hypomagnesemia, hypophosphatemia, and hypertension.  It appears she has attended the various medical follow-up appointments.  In regards to her " "mental health, Radha reports she does continue to hear voices daily, primarily in morning and bedtime.  Voices typically make negative and derogatory comments about her.  No command hallucinations.  No visual hallucinations.  No delusional thought content.  Currently taking Seroquel 200mg and has been several years.  Reports numerous previous antipsychotic trials.  Radha reports she \"would like the voices to go away and not to be on any pills and still be ok.\"  She does not want to adjust medications today as the Seroquel does help manage auditory hallucinations and helps with sleep promotion.  She does use various distraction skills to help manage breakthrough symptoms including online games, music, and reading.  No concerns with mood currently.  Fatigue is problematic and probably attributable to numerous medical issus and sedative qualities of Seroquel.      Aside from sedation, no other concerns with Seroquel.  Adherent to all medications.      Recent significant lab values (6/22/19)  A1C: 10.1  Creatinine: 1.47  GFR: 35    Recent Symptoms:   Depression:  not endorsed today  Elevated:  none  Psychosis:  auditory hallucinations without commands [details in Interim History]  Anxiety:  periodic worry  Panic Attack:  none  Trauma Related:  none     Recent Substance Use:  Alcohol- no, denies frequent alcohol use , Cannabis- no  and Other Illicit Drugs-none        Social/ Family History      [1ea,1ea]            [per patient report]               FINANCIAL SUPPORT- inheritance and payment for babysitting her grandchildren       CHILDREN- 2 adult children.  6 grandchildren   LIVING SITUATION- Lives alone in [facility] in [place]      LEGAL- None  EARLY HISTORY/ EDUCATION- Grew up in [place], [place].  2 years of [employer] school and 2 years at [employer]  SOCIAL/ SPIRITUAL SUPPORT- daughter, high-school friends.  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     Medical Review of Systems         [2,10]   The remainder of the review of systems is noncontributory  Allergy    Cinnamon, Solifenacin, and Sulfa drugs  Current Medications        Current Outpatient Medications   Medication Sig Dispense Refill     amLODIPine (NORVASC) 5 MG tablet        amoxicillin (AMOXIL) 500 MG capsule Take 4 capsules by mouth       aspirin  MG tablet Take 81 mg by mouth daily        atorvastatin (LIPITOR) 40 MG tablet Take 40 mg by mouth daily       BREO ELLIPTA 200-25 MCG/INH Inhaler INL 1 PUFF PO QD  11     folic acid (FOLVITE) 1 MG tablet Take 1 tablet (1 mg) by mouth daily (Patient not taking: Reported on 7/5/2019) 90 tablet 0     glipiZIDE (GLUCOTROL XL) 5 MG 24 hr tablet Take 5 mg by mouth       lisinopril (PRINIVIL/ZESTRIL) 10 MG tablet Take 10 mg by mouth       loratadine (CLARITIN) 10 MG tablet Take 1 tablet (10 mg) by mouth daily 30 tablet 1     magnesium oxide 200 MG TABS Take 200 tablets by mouth       methimazole (TAPAZOLE) 5 MG tablet Take 5 mg by mouth       metoprolol succinate ER (TOPROL-XL) 100 MG 24 hr tablet Take 100 mg by mouth daily       Omega-3 Fatty Acids (EQL OMEGA 3 FISH OIL) 1200 MG CAPS Take 1 capsule by mouth daily       omeprazole (PRILOSEC) 20 MG CR capsule Take 20 mg by mouth       pantoprazole sodium (PROTONIX) 40 MG packet Take 40 mg by mouth daily       predniSONE (DELTASONE) 5 MG tablet Take 5 mg by mouth       psyllium 28.3 % POWD Take 1 tsp. by mouth daily       QUEtiapine (SEROQUEL) 200 MG tablet TAKE 1 TABLET(200 MG) BY MOUTH AT  "BEDTIME 30 tablet 0     Vitals         [3, 3]   There were no vitals taken for this visit.   Mental Status Exam        [9, 14 cog gs]     Alertness: alert  and oriented  Appearance: unable to assess  Behavior/Demeanor: cooperative and pleasant, with unable to assess eye contact   Speech: regular rate and rhythm  Language: good and no obvious problem  Psychomotor: unable to assess  Mood: \"good\"  Affect: unable to assess; was congruent to mood; was congruent to content  Thought Process/Associations: unremarkable  Thought Content:  Reports none;  Denies suicidal ideation and violent ideation  Perception:  Reports auditory hallucinations without commands [details in Interim History];  Denies visual hallucinations  Insight: adequate  Judgment: intact  Cognition: (6) does  appear grossly intact; formal cognitive testing was not done  Gait/Station and/or Muscle Strength/Tone: unable to assess    Labs and Data                          Rating Scales:    PHQ9    PHQ9 Today:  Not completed  PHQ-9 SCORE 5/8/2015 10/26/2018 1/10/2020   PHQ-9 Total Score 0 - -   PHQ-9 Total Score - 0 1         Diagnosis      Psychosis, unspecified type (H)  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        Plan                                                                                                                     m2, h3     1) MEDICATION:  Continue Seroquel 200mg    RTC: 6 months per Radha's request.  Advised to call clinic if experiences any concerns    CRISIS NUMBERS:   Provided routinely in AVS.    Treatment Risk Statement:  The patient understands the risks, benefits, adverse effects and alternatives. Agrees to treatment with the capacity to do so. No medical contraindications to treatment. Agrees to call clinic for any problems. The patient understands to call " 911 or go to the nearest ED if life threatening or urgent symptoms occur.     WHODAS 2.0  TODAY total score = N/A; [a 12-item WHODAS 2.0 assessment was not completed by the pt today and/or recorded in EPIC].       PROVIDER:  KANCHAN Zacarias CNP

## 2022-03-25 DIAGNOSIS — F29 PSYCHOSIS, UNSPECIFIED PSYCHOSIS TYPE (H): ICD-10-CM

## 2022-03-25 RX ORDER — QUETIAPINE FUMARATE 200 MG/1
TABLET, FILM COATED ORAL
Qty: 7 TABLET | Refills: 0 | Status: SHIPPED | OUTPATIENT
Start: 2022-03-25

## 2022-03-25 NOTE — TELEPHONE ENCOUNTER
[facility] Call Center    Phone Message    May a detailed message be left on voicemail: yes     Reason for Call: Medication Refill Request    Has the patient contacted the pharmacy for the refill? Yes   Name of medication being requested: quetiapine  Provider who prescribed the medication: Mika Jean  Pharmacy: [facility] on [facility]  Date medication is needed: ASAP. Pt is able to get three tablets from pharmacy. Pt has appt scheduled on 4/7 w/ new provider at [facility] and [facility].          Action Taken: Nona Kendrick and Isreal Vazquez    Travel Screening: Not Applicable

## 2022-03-25 NOTE — TELEPHONE ENCOUNTER
Last Seen 9/29  RTC 6 months  Cancel None  No-Show None    Next Appt None    Incoming Refill From patient via telephone    Medication Requested   QUEtiapine (SEROQUEL) 200 MG tablet    Directions   TAKE 1 TABLET(200 MG) BY MOUTH AT BEDTIME    Qty 90    Last Refill Approx. 12/29      7 days supply pended and routed to doctor of the day for approval due to patient having an appointment on 4/7.